# Patient Record
Sex: MALE | Race: WHITE | NOT HISPANIC OR LATINO | Employment: FULL TIME | URBAN - METROPOLITAN AREA
[De-identification: names, ages, dates, MRNs, and addresses within clinical notes are randomized per-mention and may not be internally consistent; named-entity substitution may affect disease eponyms.]

---

## 2022-08-01 ENCOUNTER — LAB (OUTPATIENT)
Dept: LAB | Facility: CLINIC | Age: 37
End: 2022-08-01
Payer: COMMERCIAL

## 2022-08-01 DIAGNOSIS — Z20.822 ENCOUNTER FOR LABORATORY TESTING FOR COVID-19 VIRUS: ICD-10-CM

## 2022-08-01 LAB — SARS-COV-2 RNA RESP QL NAA+PROBE: NEGATIVE

## 2022-08-01 PROCEDURE — U0003 INFECTIOUS AGENT DETECTION BY NUCLEIC ACID (DNA OR RNA); SEVERE ACUTE RESPIRATORY SYNDROME CORONAVIRUS 2 (SARS-COV-2) (CORONAVIRUS DISEASE [COVID-19]), AMPLIFIED PROBE TECHNIQUE, MAKING USE OF HIGH THROUGHPUT TECHNOLOGIES AS DESCRIBED BY CMS-2020-01-R: HCPCS

## 2022-08-01 PROCEDURE — U0005 INFEC AGEN DETEC AMPLI PROBE: HCPCS

## 2023-12-20 ENCOUNTER — HOSPITAL ENCOUNTER (EMERGENCY)
Facility: HOSPITAL | Age: 38
Discharge: HOME | End: 2023-12-20
Attending: STUDENT IN AN ORGANIZED HEALTH CARE EDUCATION/TRAINING PROGRAM | Admitting: STUDENT IN AN ORGANIZED HEALTH CARE EDUCATION/TRAINING PROGRAM
Payer: COMMERCIAL

## 2023-12-20 ENCOUNTER — APPOINTMENT (OUTPATIENT)
Dept: RADIOLOGY | Facility: HOSPITAL | Age: 38
End: 2023-12-20
Attending: EMERGENCY MEDICINE
Payer: COMMERCIAL

## 2023-12-20 VITALS
OXYGEN SATURATION: 98 % | HEIGHT: 77 IN | DIASTOLIC BLOOD PRESSURE: 97 MMHG | SYSTOLIC BLOOD PRESSURE: 166 MMHG | WEIGHT: 315 LBS | RESPIRATION RATE: 16 BRPM | BODY MASS INDEX: 37.19 KG/M2 | TEMPERATURE: 97.1 F | HEART RATE: 95 BPM

## 2023-12-20 DIAGNOSIS — R05.4 COUGH SYNCOPE: ICD-10-CM

## 2023-12-20 DIAGNOSIS — R55 COUGH SYNCOPE: ICD-10-CM

## 2023-12-20 LAB
ALBUMIN SERPL BCG-MCNC: 4.4 G/DL (ref 3.5–5.2)
ALP SERPL-CCNC: 74 U/L (ref 40–150)
ALT SERPL W P-5'-P-CCNC: 46 U/L (ref 0–70)
ANION GAP SERPL CALCULATED.3IONS-SCNC: 11 MMOL/L (ref 7–15)
AST SERPL W P-5'-P-CCNC: 26 U/L (ref 0–45)
BASOPHILS # BLD AUTO: 0 10E3/UL (ref 0–0.2)
BASOPHILS NFR BLD AUTO: 1 %
BILIRUB DIRECT SERPL-MCNC: <0.2 MG/DL (ref 0–0.3)
BILIRUB SERPL-MCNC: 0.5 MG/DL
BUN SERPL-MCNC: 11.4 MG/DL (ref 6–20)
CALCIUM SERPL-MCNC: 9 MG/DL (ref 8.6–10)
CHLORIDE SERPL-SCNC: 106 MMOL/L (ref 98–107)
CREAT SERPL-MCNC: 0.78 MG/DL (ref 0.67–1.17)
DEPRECATED HCO3 PLAS-SCNC: 23 MMOL/L (ref 22–29)
EGFRCR SERPLBLD CKD-EPI 2021: >90 ML/MIN/1.73M2
EOSINOPHIL # BLD AUTO: 0.2 10E3/UL (ref 0–0.7)
EOSINOPHIL NFR BLD AUTO: 2 %
ERYTHROCYTE [DISTWIDTH] IN BLOOD BY AUTOMATED COUNT: 12.5 % (ref 10–15)
GLUCOSE SERPL-MCNC: 93 MG/DL (ref 70–99)
HCT VFR BLD AUTO: 42.3 % (ref 40–53)
HGB BLD-MCNC: 14.1 G/DL (ref 13.3–17.7)
IMM GRANULOCYTES # BLD: 0 10E3/UL
IMM GRANULOCYTES NFR BLD: 0 %
LYMPHOCYTES # BLD AUTO: 1.9 10E3/UL (ref 0.8–5.3)
LYMPHOCYTES NFR BLD AUTO: 23 %
MCH RBC QN AUTO: 29.5 PG (ref 26.5–33)
MCHC RBC AUTO-ENTMCNC: 33.3 G/DL (ref 31.5–36.5)
MCV RBC AUTO: 89 FL (ref 78–100)
MONOCYTES # BLD AUTO: 0.9 10E3/UL (ref 0–1.3)
MONOCYTES NFR BLD AUTO: 11 %
NEUTROPHILS # BLD AUTO: 5.1 10E3/UL (ref 1.6–8.3)
NEUTROPHILS NFR BLD AUTO: 63 %
NRBC # BLD AUTO: 0 10E3/UL
NRBC BLD AUTO-RTO: 0 /100
PLATELET # BLD AUTO: 222 10E3/UL (ref 150–450)
POTASSIUM SERPL-SCNC: 4.1 MMOL/L (ref 3.4–5.3)
PROT SERPL-MCNC: 7.2 G/DL (ref 6.4–8.3)
RBC # BLD AUTO: 4.78 10E6/UL (ref 4.4–5.9)
SODIUM SERPL-SCNC: 140 MMOL/L (ref 135–145)
TROPONIN T SERPL HS-MCNC: <6 NG/L
WBC # BLD AUTO: 8.1 10E3/UL (ref 4–11)

## 2023-12-20 PROCEDURE — 71046 X-RAY EXAM CHEST 2 VIEWS: CPT

## 2023-12-20 PROCEDURE — 36415 COLL VENOUS BLD VENIPUNCTURE: CPT

## 2023-12-20 PROCEDURE — 84484 ASSAY OF TROPONIN QUANT: CPT

## 2023-12-20 PROCEDURE — 85025 COMPLETE CBC W/AUTO DIFF WBC: CPT

## 2023-12-20 PROCEDURE — 80053 COMPREHEN METABOLIC PANEL: CPT

## 2023-12-20 PROCEDURE — 82248 BILIRUBIN DIRECT: CPT

## 2023-12-20 PROCEDURE — 99285 EMERGENCY DEPT VISIT HI MDM: CPT | Mod: 25

## 2023-12-20 PROCEDURE — 93005 ELECTROCARDIOGRAM TRACING: CPT | Performed by: EMERGENCY MEDICINE

## 2023-12-20 RX ORDER — PREDNISONE 20 MG/1
TABLET ORAL
Qty: 10 TABLET | Refills: 0 | Status: SHIPPED | OUTPATIENT
Start: 2023-12-20 | End: 2024-04-15

## 2023-12-20 ASSESSMENT — ACTIVITIES OF DAILY LIVING (ADL): ADLS_ACUITY_SCORE: 35

## 2023-12-20 NOTE — ED PROVIDER NOTES
"Emergency Department Encounter   NAME: Sunil Butts ; AGE: 38 year old male ; YOB: 1985 ; MRN: [id number] ; PCP: No Ref-Primary, Physician   ED PROVIDER: Aimee Graham PA-C    Evaluation Date & Time:   No admission date for patient encounter.    CHIEF COMPLAINT:  Cough      Impression and Plan   Medical Decision Making    Sunil Butts is a 38 year old male with a history of mild persistent asthma who presents for evaluation of a syncopal episode after a coughing fit that lasted for \"some time\" and felt like he could not catch his breath and then passed out for 10-15 seconds. When he came to, he felt a little bit lightheaded but denies hitting his head.  His wife denies that he was jerking/moving at all or rapid eye movements. He denies biting his tongue or loss of bladder control.  Denies hx VTE, seizures, heart disease, or other syncopal episodes. Denies recent travel, surgery, hospitalization. No hx VTE, heart disease, or hx syncopal episodes. Vitals afebrile, not tachycardic, and not hypoxic. On exam well-appearing in no acute distress sitting upright in the exam bed.  Lungs are clear to auscultation without rales, wheezing, stridor throughout.  Heart with regular rate and rhythm without adventitious sounds auscultated.    Differential diagnosis includes vasovagal or situational such as coughing or valsalva, cardiac causes including pauses, arrhythmias, or murmurs, pulmonary embolism, seizure, electrolyte abnormalities, anemia, pneumonia. Emergency department work up included chest x-ray, EKG, and basic labs eluding troponin, CMP, and CBC. Work up revealed no leukocytosis.  No electrolyte abnormalities.  Normal troponin less than 6.  Normal hemoglobin..  Given that all of these labs are normal, this is reassuring.  This is unlikely secondary to lab finding such as electrolyte abnormalities or anemia.  EKG showed normal sinus rhythm and with a troponin less than 6 and no chest pain, " cardiac causes such as arrhythmias or pauses are unlikely.  Chest x-ray showed no evidence of pneumonia or infection.  I was able to PERC patient out so did not order a D-dimer.  He is not tachycardic, no recent travel surgery or hospitalizations.  No history of blood clots.  PE is very low on my differential.      Symptoms and work up most consistent with situational/vasovagal type syncope.  Discussed with the patient these findings.  He felt comfortable with this plan.  Given history of asthma and cough that is persisting, recommended that he continue to use his inhaler.  Also prescribed a 5-day course of prednisone that he can  from the pharmacy.  Discussed he may want to get a primary care provider to discuss further treatment of his asthma.  Although was well-controlled up until this point, he may need to be on additional treatments or have additional workup.  Gave him the information for [facility] because he did not have a place he was going currently. Patient was discharged in stable condition but instructed to return to the emergency department with any new or worsening of symptoms. Patient expressed understanding, feels comfortable, and is in agreement with this plan. All questions addressed prior to discharge.    History:  Supplemental history from: Documented in chart, if applicable  External Record(s) reviewed: Documented in chart, if applicable. and Outpatient Record: Reviewed from 2/12/2020.  Patient was seen in the clinic for positive influenza and cough.  Certainly better and now had a low-grade fever with a cough that returned, rattling in his chest when he sleeps, myalgias, and bodyaches.  Was diagnosed with pneumonia    Work Up:  Chart documentation includes differential considered and any EKGs or imaging independently interpreted by provider, where specified.  In additional to work up documented, I considered the following work up: na    External consultation:  Discussion of  "management with another provider: na    Complicating factors:  Care impacted by chronic illness: asthma  Care affected by social determinants of health: N/A    Disposition considerations: Discharge. I prescribed additional prescription strength medication(s) as charted. See documentation for any additional details.      ED COURSE:  1800 I met and introduced myself to the patient. I gathered initial history and performed my physical exam. We discussed plan for initial workup.   1815 I have staffed the patient with Dr. Pee Meyers, ED MD, who has evaluated the patient and agrees with all aspects of today's care.   1900 We discussed the plan for discharge and the patient is agreeable. Reviewed supportive cares, symptomatic treatment, outpatient follow up, and reasons to return to the Emergency Department. Patient to be discharged by ED RN.           FINAL IMPRESSION:    ICD-10-CM    1. Cough syncope  R55     R05.4           MEDICATIONS GIVEN IN THE EMERGENCY DEPARTMENT:  Medications - No data to display    NEW PRESCRIPTIONS STARTED AT TODAY'S ED VISIT:  Discharge Medication List as of 12/20/2023  8:07 PM        START taking these medications    Details   predniSONE (DELTASONE) 20 MG tablet Take two tablets (= 40mg) each day for 5 (five) days, Disp-10 tablet, R-0, E-Prescribe             HPI   Patient information was obtained from: patient   Use of Intrepreter: N/A     Sunil Butts is a 38 year old male with a history of mild persistent asthma who presents for evaluation of a syncopal episode. Hx cough x 1 month after diagnosis of covid. Today he was having a coughing fit that lasted for \"some time\" and he felt like he could not catch his breath and he passed out.  He was sitting on the couch next to his wife when this happened.  He was unresponsive for about 10 seconds.  When he came to, he felt a little bit lightheaded but denies hitting his head.  His wife denies that he was jerking/moving at all or " "rapid eye movements. He denies biting his tongue or loss of bladder control.  Denies hx VTE, seizures, heart disease, or other syncopal episodes. Denies recent travel, surgery, hospitalization.     He was seen in the urgent care on 12/2/2023 for a cough.  At that point had had COVID for 2 weeks and his cough is not getting better.  They did a chest x-ray to rule out pneumonia.  They treated him with steroids and gave him an inhaler.  Felt like his cough got a little bit better after being on the steroids but still lingers.  He is taking Robitussin and Advil for his symptoms..  His asthma is well-controlled typically, only using a rescue inhaler on occasion.      Denies fevers, chills, shortness of breath, palpitations, chest pain, nausea, vomiting, diarrhea, abdominal pain, melena, hematochezia, hemoptysis, changes in vision.     Medical History     No past medical history on file.    No past surgical history on file.    No family history on file.         predniSONE (DELTASONE) 20 MG tablet        Physical Exam     First Vitals:  Patient Vitals for the past 24 hrs:   BP Temp Temp src Pulse Resp SpO2 Height Weight   12/20/23 2010 (!) 166/97 -- -- 95 -- 98 % -- --   12/20/23 1930 (!) 144/89 -- -- 85 -- 98 % -- --   12/20/23 1900 (!) 143/90 -- -- 89 -- 97 % -- --   12/20/23 1858 136/89 -- -- 83 -- 98 % -- --   12/20/23 1634 (!) 163/88 97.1  F (36.2  C) Temporal 92 16 98 % 1.956 m (6' 5\") 149.7 kg (330 lb)       PHYSICAL EXAM:   Physical Exam  Constitutional:       General: He is not in acute distress.     Appearance: Normal appearance. He is not ill-appearing.   HENT:      Mouth/Throat:      Mouth: Mucous membranes are moist.      Pharynx: No posterior oropharyngeal erythema.   Cardiovascular:      Rate and Rhythm: Normal rate and regular rhythm.      Pulses: Normal pulses.      Heart sounds: Normal heart sounds.   Pulmonary:      Effort: Pulmonary effort is normal. No respiratory distress.      Breath sounds: Normal " breath sounds. No stridor. No wheezing, rhonchi or rales.   Chest:      Chest wall: No tenderness.   Abdominal:      General: Abdomen is flat.      Tenderness: There is no abdominal tenderness.   Musculoskeletal:         General: No tenderness. Normal range of motion.      Right lower leg: No edema.      Left lower leg: No edema.   Skin:     General: Skin is warm.   Neurological:      General: No focal deficit present.      Mental Status: He is alert.      Sensory: No sensory deficit.           Results     LAB:  All pertinent labs reviewed and interpreted  Labs Ordered and Resulted from Time of ED Arrival to Time of ED Departure   BASIC METABOLIC PANEL - Normal       Result Value    Sodium 140      Potassium 4.1      Chloride 106      Carbon Dioxide (CO2) 23      Anion Gap 11      Urea Nitrogen 11.4      Creatinine 0.78      GFR Estimate >90      Calcium 9.0      Glucose 93     HEPATIC FUNCTION PANEL - Normal    Protein Total 7.2      Albumin 4.4      Bilirubin Total 0.5      Alkaline Phosphatase 74      AST 26      ALT 46      Bilirubin Direct <0.20     TROPONIN T, HIGH SENSITIVITY - Normal    Troponin T, High Sensitivity <6     CBC WITH PLATELETS AND DIFFERENTIAL    WBC Count 8.1      RBC Count 4.78      Hemoglobin 14.1      Hematocrit 42.3      MCV 89      MCH 29.5      MCHC 33.3      RDW 12.5      Platelet Count 222      % Neutrophils 63      % Lymphocytes 23      % Monocytes 11      % Eosinophils 2      % Basophils 1      % Immature Granulocytes 0      NRBCs per 100 WBC 0      Absolute Neutrophils 5.1      Absolute Lymphocytes 1.9      Absolute Monocytes 0.9      Absolute Eosinophils 0.2      Absolute Basophils 0.0      Absolute Immature Granulocytes 0.0      Absolute NRBCs 0.0         RADIOLOGY:  XR Chest 2 Views   Final Result   IMPRESSION: Negative chest.          ECG:    Performed at: 12/20/23  Impression: normal sinus rhythm    Rate: 81  Rhythm: normal sinus rhythm  Axis: normal  MA Interval: 162  QRS  Interval: 90  QTc Interval: 425  ST Changes: none  Comparison: none    EKG results reviewed and interpreted by Dr. Gee Miller, ED MD.     Aimee Graham PA-C  Emergency Medicine   [facility] EMERGENCY DEPARTMENT       Aimee Graham PA-C  12/20/23 8794

## 2023-12-20 NOTE — ED TRIAGE NOTES
Pt with covid a month ago and has had persistent cough since.  Pt also diagnoses with bronchitis and given steroids but cough continued.  Today pt had a coughing spell and passed out for 10 secs.  Denies fevers/chills.

## 2023-12-21 NOTE — ED NOTES
Discussed following up with PCP regarding cough and blood pressure once he is done taking cough medications and prednisone.  Patient verbalized understanding.

## 2023-12-21 NOTE — ED PROVIDER NOTES
"Emergency Department Midlevel Supervisory Note     I personally saw the patient and performed a substantive portion of the visit including all aspects of the medical decision making.    ED Course:  7:07 PM Aimee Graham PA-C staffed patient with me. I agree with their assessment and plan of management, and I will see the patient.   I met with the patient to introduce myself, gather additional history, perform my initial exam, and discuss the plan.     Brief HPI:     Sunil Butts is a 38 year old male who presents for evaluation of cough x 1 month. He coughed today for about 1 minute, and was unable to pass out; in which he eventually passed out. Was unresponsive for about 10 seconds. Felt mild lightheadedness and disorientation, but denies hitting his head. Had Covid since 12/2/23, and cough hasn't got better since then.     I, Koki Rhodes , am serving as a scribe to document services personally performed by Pee Quan MD based on my observations and the provider's statements to me.   I, Pee Quan MD attest that Koki Rhodes  was acting in a scribe capacity, has observed my performance of the services and has documented them in accordance with my direction.    Brief Physical Exam: BP (!) 166/97   Pulse 95   Temp 97.1  F (36.2  C) (Temporal)   Resp 16   Ht 1.956 m (6' 5\")   Wt 149.7 kg (330 lb)   SpO2 98%   BMI 39.13 kg/m    Constitutional:  Alert, in no acute distress  EYES: Conjunctivae clear  HENT:  Atraumatic, normocephalic  Respiratory:  Respirations even, unlabored, in no acute respiratory distress  Cardiovascular:  Regular rate and rhythm, good peripheral perfusion  GI: Soft, nondistended, nontender, no palpable masses, no rebound, no guarding   Musculoskeletal:  No edema. No cyanosis. Range of motion major extremities intact.    Integument: Warm, Dry, No erythema, No rash.   Neurologic:  Alert & oriented, no focal deficits noted  Psych: Normal mood and affect   "   MDM:  Patient's history is consistent with cough syncope, and fortunately the workup to look for other etiologies of syncope and a 38-year-old healthy male were negative.  Including negative troponin, reassuring EKG, no electrolyte abnormalities or kidney injury, no anemia.  Patient discharged with plans for outpatient follow-up.           1. Cough syncope        Labs and Imaging:  Results for orders placed or performed during the hospital encounter of 12/20/23   XR Chest 2 Views    Impression    IMPRESSION: Negative chest.   Basic metabolic panel   Result Value Ref Range    Sodium 140 135 - 145 mmol/L    Potassium 4.1 3.4 - 5.3 mmol/L    Chloride 106 98 - 107 mmol/L    Carbon Dioxide (CO2) 23 22 - 29 mmol/L    Anion Gap 11 7 - 15 mmol/L    Urea Nitrogen 11.4 6.0 - 20.0 mg/dL    Creatinine 0.78 0.67 - 1.17 mg/dL    GFR Estimate >90 >60 mL/min/1.73m2    Calcium 9.0 8.6 - 10.0 mg/dL    Glucose 93 70 - 99 mg/dL   Hepatic function panel   Result Value Ref Range    Protein Total 7.2 6.4 - 8.3 g/dL    Albumin 4.4 3.5 - 5.2 g/dL    Bilirubin Total 0.5 <=1.2 mg/dL    Alkaline Phosphatase 74 40 - 150 U/L    AST 26 0 - 45 U/L    ALT 46 0 - 70 U/L    Bilirubin Direct <0.20 0.00 - 0.30 mg/dL   Result Value Ref Range    Troponin T, High Sensitivity <6 <=22 ng/L   CBC with platelets and differential   Result Value Ref Range    WBC Count 8.1 4.0 - 11.0 10e3/uL    RBC Count 4.78 4.40 - 5.90 10e6/uL    Hemoglobin 14.1 13.3 - 17.7 g/dL    Hematocrit 42.3 40.0 - 53.0 %    MCV 89 78 - 100 fL    MCH 29.5 26.5 - 33.0 pg    MCHC 33.3 31.5 - 36.5 g/dL    RDW 12.5 10.0 - 15.0 %    Platelet Count 222 150 - 450 10e3/uL    % Neutrophils 63 %    % Lymphocytes 23 %    % Monocytes 11 %    % Eosinophils 2 %    % Basophils 1 %    % Immature Granulocytes 0 %    NRBCs per 100 WBC 0 <1 /100    Absolute Neutrophils 5.1 1.6 - 8.3 10e3/uL    Absolute Lymphocytes 1.9 0.8 - 5.3 10e3/uL    Absolute Monocytes 0.9 0.0 - 1.3 10e3/uL    Absolute Eosinophils  0.2 0.0 - 0.7 10e3/uL    Absolute Basophils 0.0 0.0 - 0.2 10e3/uL    Absolute Immature Granulocytes 0.0 <=0.4 10e3/uL    Absolute NRBCs 0.0 10e3/uL     I have reviewed the relevant laboratory and radiology studies    Procedures:  I was present for the key portions of this procedure: none    Pee Quan MD  [facility] EMERGENCY DEPARTMENT  [address] [place] [id number] [phone]     Pee Quan MD  12/21/23 0052

## 2023-12-21 NOTE — DISCHARGE INSTRUCTIONS
You were seen in the ER after a syncopal episode secondary to a coughing fit.  Syncope can be caused by many things including benign vagal maneuvers such as coughing but can sometimes be caused by other more serious conditions.  A workup was done to assess that nothing more serious was going on.  We did an EKG of your heart to assess for arrhythmias which showed normal rhythm.  We did a chest x-ray to assess for a pneumonia or fluid around your lungs which was normal.  We did some blood work to look at your heart enzymes, electrolytes, kidney function, liver function, and for signs of infection which were all normal.  Your syncopal episode was likely secondary to a coughing fit.  Your body response of this lack of air from prolonged coughing by fainting.  Your cough should resolve on its own with time.  I would continue to use over-the-counter cough medicine.  Continue to use your inhaler when you feel short of breath or hear wheezing. I will also give you another 5 day course of prednisone to help with your symptoms. You may want to follow-up with your primary care provider if this cough persists for reevaluation.  Please return to the emergency room if you are having any difficulty breathing, chest pain, or any other concerning symptoms.

## 2023-12-29 ENCOUNTER — OFFICE VISIT (OUTPATIENT)
Dept: FAMILY MEDICINE | Facility: CLINIC | Age: 38
End: 2023-12-29
Payer: COMMERCIAL

## 2023-12-29 VITALS
BODY MASS INDEX: 38.94 KG/M2 | RESPIRATION RATE: 16 BRPM | OXYGEN SATURATION: 95 % | TEMPERATURE: 98.4 F | HEART RATE: 101 BPM | WEIGHT: 315 LBS | DIASTOLIC BLOOD PRESSURE: 91 MMHG | SYSTOLIC BLOOD PRESSURE: 144 MMHG

## 2023-12-29 DIAGNOSIS — Z11.59 NEED FOR HEPATITIS C SCREENING TEST: ICD-10-CM

## 2023-12-29 DIAGNOSIS — Z11.4 SCREENING FOR HIV (HUMAN IMMUNODEFICIENCY VIRUS): ICD-10-CM

## 2023-12-29 DIAGNOSIS — Z13.220 LIPID SCREENING: ICD-10-CM

## 2023-12-29 DIAGNOSIS — J45.909 REACTIVE AIRWAY DISEASE WITHOUT COMPLICATION, UNSPECIFIED ASTHMA SEVERITY, UNSPECIFIED WHETHER PERSISTENT: Primary | ICD-10-CM

## 2023-12-29 DIAGNOSIS — R05.2 SUBACUTE COUGH: ICD-10-CM

## 2023-12-29 LAB
CHOLEST SERPL-MCNC: 199 MG/DL
FASTING STATUS PATIENT QL REPORTED: ABNORMAL
HCV AB SERPL QL IA: NONREACTIVE
HDLC SERPL-MCNC: 31 MG/DL
HIV 1+2 AB+HIV1 P24 AG SERPL QL IA: NONREACTIVE
LDLC SERPL CALC-MCNC: 127 MG/DL
NONHDLC SERPL-MCNC: 168 MG/DL
TRIGL SERPL-MCNC: 207 MG/DL

## 2023-12-29 PROCEDURE — 80061 LIPID PANEL: CPT

## 2023-12-29 PROCEDURE — 86803 HEPATITIS C AB TEST: CPT

## 2023-12-29 PROCEDURE — 87389 HIV-1 AG W/HIV-1&-2 AB AG IA: CPT

## 2023-12-29 PROCEDURE — 36415 COLL VENOUS BLD VENIPUNCTURE: CPT

## 2023-12-29 PROCEDURE — 99214 OFFICE O/P EST MOD 30 MIN: CPT | Mod: GC

## 2023-12-29 RX ORDER — ALBUTEROL SULFATE 90 UG/1
2 AEROSOL, METERED RESPIRATORY (INHALATION)
Status: ON HOLD | COMMUNITY
Start: 2023-12-02 | End: 2024-09-10

## 2023-12-29 RX ORDER — ALBUTEROL SULFATE 90 UG/1
2 AEROSOL, METERED RESPIRATORY (INHALATION) EVERY 6 HOURS PRN
Qty: 18 G | Refills: 0 | Status: SHIPPED | OUTPATIENT
Start: 2023-12-29 | End: 2024-01-23

## 2023-12-29 RX ORDER — BENZONATATE 100 MG/1
CAPSULE ORAL
COMMUNITY
Start: 2023-12-19 | End: 2024-04-15

## 2023-12-29 RX ORDER — BENZONATATE 100 MG/1
100 CAPSULE ORAL 3 TIMES DAILY PRN
Qty: 30 CAPSULE | Refills: 0 | Status: SHIPPED | OUTPATIENT
Start: 2023-12-29 | End: 2024-04-15

## 2023-12-29 RX ORDER — FLUTICASONE FUROATE 100 UG/1
1 POWDER RESPIRATORY (INHALATION) DAILY
Qty: 30 EACH | Refills: 0 | Status: SHIPPED | OUTPATIENT
Start: 2023-12-29 | End: 2024-04-15

## 2023-12-29 NOTE — PROGRESS NOTES
Preceptor Attestation:    I discussed the patient with the resident and evaluated the patient in person. I have verified the content of the note, which accurately reflects my assessment of the patient and the plan of care.   Supervising Physician:  Tomas Nuñez MD.

## 2023-12-29 NOTE — PROGRESS NOTES
{PROVIDER CHARTING PREFERENCE:912756}    Janet Barber is a 38 year old, presenting for the following health issues:  Other (New patient/Covid before thanksgiving/Urgent care after that went to hospital 1 week ago that caused him to faint. Following up from hospital after getting steroid medication to help with cough. )  {(!) Visit Details have not yet been documented.  Please enter Visit Details and then use this list to pull in documentation. (Optional):729483}    [facility]     [employer], went to clinic before covid. Test positive for covid prior to thanksgiving. 4-5 weeks barely had a voice. Went to urgent care 2 weeks later, negative for penumonia. Steroid given, helped a bit but didn't shake it. Coughing fit caused fainting, went to ER. Cough a lot better within last 2 days.    Hacking cough unloading car. No issues with SOB, chest tightness except with coughing.     Covid 2021 also. Not as long a cough then.    Asthma as a kid. Has not had problems for last 15 years. Did get albuterol inhaler at urgent care. Does help when he uses the inhaler. Using inhaler 3-4 times a day. On Advair as a kid. Hospitalized at some point, nebulizer at home.     No smoking. High blood pressures lately.     Using Mucinex, tessalon.    {MA/LPN/RN Pre-Provider Visit Orders- hCG/UA/Strep (Optional):277173}  {SUPERLIST (Optional):724062}  {additonal problems for provider to add (Optional):623338}      Review of Systems   {ROS COMP (Optional):955008}      Objective    BP (!) 144/91   Pulse 101   Temp 98.4  F (36.9  C) (Oral)   Resp 16   Wt 149 kg (328 lb 6.4 oz)   SpO2 95%   BMI 38.94 kg/m    Body mass index is 38.94 kg/m .  Physical Exam   {Exam List (Optional):578972}    {Diagnostic Test Results (Optional):854654}    {AMBULATORY ATTESTATION (Optional):421713}

## 2023-12-29 NOTE — PROGRESS NOTES
Assessment & Plan     Reactive airway disease without complication, unspecified asthma severity, unspecified whether persistent  Subacute cough  Positive COVID prior Thanksgiving.  Has been seen at urgent care and the ED.  Status-post steroid bursts x 2.  Has been using albuterol inhaler as needed, often 3-4 times daily.  Continues to have fairly significant cough.  Notes history of asthma in childhood recalls being on a daily medication sometime ago.  Suspect there may be some asthma exacerbation component to his current symptoms.  Therefore we will start daily inhaler fluticasone as well as refill albuterol inhaler as needed.  Plan for close follow-up 10 to 14 days for further evaluation of possible asthma exacerbation and appropriate treatment.  - albuterol (PROAIR HFA/PROVENTIL HFA/VENTOLIN HFA) 108 (90 Base) MCG/ACT inhaler; Inhale 2 puffs into the lungs every 6 hours as needed for shortness of breath, wheezing or cough  - fluticasone (ARNUITY ELLIPTA) 100 MCG/ACT inhaler; Inhale 1 puff into the lungs daily  - benzonatate (TESSALON) 100 MG capsule; Take 1 capsule (100 mg) by mouth 3 times daily as needed for cough    Screening for HIV (human immunodeficiency virus)  - HIV Screening    Need for hepatitis C screening test  - Hepatitis C Screen Reflex to HCV RNA Quant and Genotype    Lipid screening  - Lipid panel reflex to direct LDL Non-fasting    Return in about 2 weeks (around 1/12/2024) for Follow up.    TASIA FERNANDEZ MD  [facility] JOSEPH Barber is a 38 year old, presenting for the following health issues:  Other (New patient/Covid before thanksgiving/Urgent care after that went to hospital 1 week ago that caused him to faint. Following up from hospital after getting steroid medication to help with cough. )      HPI   Patient presenting today for follow-up after ED visit on 12/20/2023.  Presented to the ED for evaluation following syncopal episode after coughing fit  during which she felt he could not catch his breath and then passed out for 10-15 seconds.  His wife reportedly witnessed the fall and denied noticing any jerking/moving of the extremities or rapid eye movements.  ED workup included CXR, EKG, troponin, CMP, CBC all of which showed no significant findings.  They suspected that his symptoms were consistent with situational/vasovagal type syncope.    Reviewed recent events with the patient again today.  He reports that he tested positive for COVID prior to Thanksgiving.  His cough lingered as well as increasing hoarseness of his voice.  As result he went to urgent care about 2 weeks later on 12/2/2023 due to concern for possible pneumonia other worsening illness.  At that time, he was prescribed albuterol inhaler as well as dexamethasone 1 day burst.  His symptoms improved briefly but did not quite shake it.  He continued to have cough.  12/20/2023, he had a coughing fit that was followed by syncopal episode as described above.  As result, he again presented to the ED.  Workup negative as described above.  He was sent to 5-day steroid burst prednisone 40 mg for 5 days.  Again, his symptoms improved somewhat after his steroid burst but he continues to have hacking cough.    Patient recalls having COVID possibly in 2021 also.  Had a cough at that time though not as long-lasting as now.  Endorses some SOB, chest tightness associated with coughing but otherwise no SOB, chest tightness, chest pain outside of coughing episodes.    Patient will be establishing care here as well.  He works for [employer] and previously went to a clinic there prior to the onset of COVID.  As result, he has not seen a physician in quite some time.  Past medical history is remarkable for asthma in childhood.  Does not think he has any problems with it for the last 15 years or so.  He does recall at one point being on a daily inhaler as a kid, possibly Advair.  Also recalls having a  nebulizer at home and possibly being hospitalized at some point, but no times requiring intubation or further escalation of cares.  Since getting the albuterol at urgent care, he has been using it 3-4 times a day.  He does have relief following use of the albuterol inhaler.  The inhaler, he is also using Mucinex and Tessalon for symptom cares.  Denies any history of tobacco use.    Patient notes high blood pressures near 140s/90s at urgent care visit, ED visit, and at his visit today.  No history of diagnosed high blood pressure/hypertension but again he has not been to a physician in quite some time.  Would like to keep monitoring this closely and stay on top of it.  Denies any chest pain, SOB, headache, lower extremity swelling.      Review of Systems   CONSTITUTIONAL: NEGATIVE for fever, chills, change in weight  ENT/MOUTH: NEGATIVE for ear, mouth and throat problems      Objective    BP (!) 144/91   Pulse 101   Temp 98.4  F (36.9  C) (Oral)   Resp 16   Wt 149 kg (328 lb 6.4 oz)   SpO2 95%   BMI 38.94 kg/m    Body mass index is 38.94 kg/m .  Physical Exam   GENERAL: healthy, alert and no distress  RESP: lungs with mild wheezing but otherwise clear to auscultation, frequent loud wet-sounding cough, nonlabored breathing on room air, able to speak in complete sentences  CV: regular rate and rhythm, normal S1 S2, no S3 or S4, no murmur, click or rub, no peripheral edema and peripheral pulses strong  MS: no gross musculoskeletal defects noted, no edema  NEURO: Normal strength and tone, mentation intact and speech normal  PSYCH: mentation appears normal, affect normal/bright    Admission on 12/20/2023, Discharged on 12/20/2023   Component Date Value Ref Range Status    Sodium 12/20/2023 140  135 - 145 mmol/L Final    Reference intervals for this test were updated on 09/26/2023 to more accurately reflect our healthy population. There may be differences in the flagging of prior results with similar values performed  with this method. Interpretation of those prior results can be made in the context of the updated reference intervals.     Potassium 12/20/2023 4.1  3.4 - 5.3 mmol/L Final    Chloride 12/20/2023 106  98 - 107 mmol/L Final    Carbon Dioxide (CO2) 12/20/2023 23  22 - 29 mmol/L Final    Anion Gap 12/20/2023 11  7 - 15 mmol/L Final    Urea Nitrogen 12/20/2023 11.4  6.0 - 20.0 mg/dL Final    Creatinine 12/20/2023 0.78  0.67 - 1.17 mg/dL Final    GFR Estimate 12/20/2023 >90  >60 mL/min/1.73m2 Final    Calcium 12/20/2023 9.0  8.6 - 10.0 mg/dL Final    Glucose 12/20/2023 93  70 - 99 mg/dL Final    Protein Total 12/20/2023 7.2  6.4 - 8.3 g/dL Final    Albumin 12/20/2023 4.4  3.5 - 5.2 g/dL Final    Bilirubin Total 12/20/2023 0.5  <=1.2 mg/dL Final    Alkaline Phosphatase 12/20/2023 74  40 - 150 U/L Final    Reference intervals for this test were updated on 11/14/2023 to more accurately reflect our healthy population. There may be differences in the flagging of prior results with similar values performed with this method. Interpretation of those prior results can be made in the context of the updated reference intervals.    AST 12/20/2023 26  0 - 45 U/L Final    Reference intervals for this test were updated on 6/12/2023 to more accurately reflect our healthy population. There may be differences in the flagging of prior results with similar values performed with this method. Interpretation of those prior results can be made in the context of the updated reference intervals.    ALT 12/20/2023 46  0 - 70 U/L Final    Reference intervals for this test were updated on 6/12/2023 to more accurately reflect our healthy population. There may be differences in the flagging of prior results with similar values performed with this method. Interpretation of those prior results can be made in the context of the updated reference intervals.      Bilirubin Direct 12/20/2023 <0.20  0.00 - 0.30 mg/dL Final    Troponin T, High Sensitivity  12/20/2023 <6  <=22 ng/L Final    Either a High Sensitivity Troponin T baseline (0 hours) value = 100 ng/L, or an increase in High Sensitivity Troponin T = 7 ng/L at 2 hours compared to 0 hours (2-0 hours), suggests myocardial injury, and urgent clinical attention is required.    If the 2-0 hours increase is <7 ng/L, a High Sensitivity Troponin T result above gender-specific reference ranges warrants further evaluation.   Recommendations for further evaluation include correlation with clinical decision-making tool (e.g., HEART), a 3rd High Sensitivity Troponin T test 2 hours after the 2nd (a 20% change from baseline would represent concern), admission for observation, close PCC/cardiology follow-up, or urgent outpatient provocative testing.    WBC Count 12/20/2023 8.1  4.0 - 11.0 10e3/uL Final    RBC Count 12/20/2023 4.78  4.40 - 5.90 10e6/uL Final    Hemoglobin 12/20/2023 14.1  13.3 - 17.7 g/dL Final    Hematocrit 12/20/2023 42.3  40.0 - 53.0 % Final    MCV 12/20/2023 89  78 - 100 fL Final    MCH 12/20/2023 29.5  26.5 - 33.0 pg Final    MCHC 12/20/2023 33.3  31.5 - 36.5 g/dL Final    RDW 12/20/2023 12.5  10.0 - 15.0 % Final    Platelet Count 12/20/2023 222  150 - 450 10e3/uL Final    % Neutrophils 12/20/2023 63  % Final    % Lymphocytes 12/20/2023 23  % Final    % Monocytes 12/20/2023 11  % Final    % Eosinophils 12/20/2023 2  % Final    % Basophils 12/20/2023 1  % Final    % Immature Granulocytes 12/20/2023 0  % Final    NRBCs per 100 WBC 12/20/2023 0  <1 /100 Final    Absolute Neutrophils 12/20/2023 5.1  1.6 - 8.3 10e3/uL Final    Absolute Lymphocytes 12/20/2023 1.9  0.8 - 5.3 10e3/uL Final    Absolute Monocytes 12/20/2023 0.9  0.0 - 1.3 10e3/uL Final    Absolute Eosinophils 12/20/2023 0.2  0.0 - 0.7 10e3/uL Final    Absolute Basophils 12/20/2023 0.0  0.0 - 0.2 10e3/uL Final    Absolute Immature Granulocytes 12/20/2023 0.0  <=0.4 10e3/uL Final    Absolute NRBCs 12/20/2023 0.0  10e3/uL Final       ----- Service  Performed and Documented by Resident or Fellow ------

## 2024-01-12 ENCOUNTER — TELEPHONE (OUTPATIENT)
Dept: FAMILY MEDICINE | Facility: CLINIC | Age: 39
End: 2024-01-12

## 2024-01-12 ENCOUNTER — OFFICE VISIT (OUTPATIENT)
Dept: FAMILY MEDICINE | Facility: CLINIC | Age: 39
End: 2024-01-12
Payer: COMMERCIAL

## 2024-01-12 VITALS
SYSTOLIC BLOOD PRESSURE: 143 MMHG | DIASTOLIC BLOOD PRESSURE: 91 MMHG | WEIGHT: 315 LBS | HEART RATE: 86 BPM | OXYGEN SATURATION: 97 % | RESPIRATION RATE: 16 BRPM | HEIGHT: 77 IN | TEMPERATURE: 98.2 F | BODY MASS INDEX: 37.19 KG/M2

## 2024-01-12 DIAGNOSIS — R05.2 SUBACUTE COUGH: Primary | ICD-10-CM

## 2024-01-12 DIAGNOSIS — J45.909 REACTIVE AIRWAY DISEASE WITHOUT COMPLICATION, UNSPECIFIED ASTHMA SEVERITY, UNSPECIFIED WHETHER PERSISTENT: ICD-10-CM

## 2024-01-12 DIAGNOSIS — R03.0 ELEVATED BLOOD PRESSURE READING WITHOUT DIAGNOSIS OF HYPERTENSION: Primary | ICD-10-CM

## 2024-01-12 PROCEDURE — 90686 IIV4 VACC NO PRSV 0.5 ML IM: CPT

## 2024-01-12 PROCEDURE — 90471 IMMUNIZATION ADMIN: CPT

## 2024-01-12 PROCEDURE — 99214 OFFICE O/P EST MOD 30 MIN: CPT | Mod: 25

## 2024-01-12 RX ORDER — FLUTICASONE PROPIONATE 110 UG/1
1 AEROSOL, METERED RESPIRATORY (INHALATION) 2 TIMES DAILY
Qty: 12 G | Refills: 3 | Status: SHIPPED | OUTPATIENT
Start: 2024-01-12 | End: 2024-04-15

## 2024-01-12 NOTE — PROGRESS NOTES
"  Assessment & Plan     Reactive airway disease without complication, unspecified asthma severity, unspecified whether persistent  Patient tested positive for COVID prior to Thanksgiving 2023.  Had a lingering and fairly significant cough causing SOB following COVID infection.  In addition, has history of childhood asthma for which he was on a daily medication for some period of time.  Given his history and current symptoms, elected to again start a daily maintenance medication fluticasone.  This was started at his follow-up visit on 12/29/2023.  It has worked well for him.  His cough has essentially resolved.  We discussed continuing on this maintenance medication at least through the winter season.  Patient is in agreement with this plan but is wondering about switching to a different medication as the previously prescribed inhaler fell under his \"premium\" insurance medication list and required a co-pay.  Therefore, prescribed fluticasone/Flovent.  Patient called back, unfortunately this was not covered either.  Instead, we will prescribe mometasone as below.   - mometasone furoate (ASMANEX HFA) 100 MCG/ACT inhaler     Elevated blood pressure reading without diagnosis of hypertension  No history of hypertension or elevated blood pressures however prior to experiencing the above issue, patient had not seen a doctor for approximately 4 years.  Since being seen for the above issue, several elevated blood pressures have been noted.  At 12/29/2023 clinic visit, blood pressure was 144/91.  At clinic visit today, blood pressure 143/91.  Blood pressure similarly elevated at urgent care/ED previously.  Discussed with the patient consideration for starting a medication for hypertension.  After further discussion with patient and attending physician Dr. Benavides, will obtain ambulatory blood pressure monitoring first prior to making decision to start medication.  Advised patient to check his blood pressure daily for at least 1 " "full week.  Follow-up as scheduled for next month to review these numbers and for further consideration as to if medication management is warranted.  Patient is in agreement with this plan.  He will also work on lifestyle changes in the interim.  - Home Blood Pressure Monitor Order for DME - ONLY FOR DME    Return in about 1 month (around 2/12/2024) for Follow up.    TASIA FERNANDEZ MD  [facility] JOSEPH Barber is a 38 year old, presenting for the following health issues:  Cough (Follow up cough- using inhaler and states that his cough is better)        1/12/2024    10:38 AM   Additional Questions   Roomed by Mao   Accompanied by self     HPI   Patient presents today for follow-up of cough, reactive airway disease likely secondary to past COVID infection in the setting of distant history of childhood asthma.  At his last visit, started him on a daily inhaler, fluticasone.  He has been taking his medication daily as prescribed.  However it is considered part of his \"premium\" medications for insurance coverage, so it does require a bit of a co-pay.  Therefore he is wondering about switching to different variation of the medication.  Otherwise no concerns.  Cough is much improved, still has mild cough but much less productive and does not cause significant shortness of breath.    Review of Systems   CONSTITUTIONAL: NEGATIVE for fever, chills, change in weight  ENT/MOUTH: NEGATIVE for ear, mouth and throat problems  RESP: NEGATIVE for significant cough or SOB  CV: NEGATIVE for chest pain, palpitations or peripheral edema      Objective    BP (!) 143/91   Pulse 86   Temp 98.2  F (36.8  C) (Oral)   Resp 16   Ht 1.956 m (6' 5\")   Wt (!) 150.1 kg (331 lb)   SpO2 97%   BMI 39.25 kg/m    Body mass index is 39.25 kg/m .  Physical Exam   GENERAL: healthy, alert and no distress  RESP: lungs clear to auscultation - no rales, rhonchi or wheezes  CV: regular rate and rhythm, normal S1 S2, " no S3 or S4, no murmur, click or rub  MS: no gross musculoskeletal defects noted, no edema  SKIN: no suspicious lesions or rashes on exposed skin  NEURO: normal strength and tone, mentation intact and speech normal  PSYCH: mentation appears normal, affect normal/bright    Office Visit on 12/29/2023   Component Date Value Ref Range Status    Cholesterol 12/29/2023 199  <200 mg/dL Final    Triglycerides 12/29/2023 207 (H)  <150 mg/dL Final    Direct Measure HDL 12/29/2023 31 (L)  >=40 mg/dL Final    LDL Cholesterol Calculated 12/29/2023 127 (H)  <=100 mg/dL Final    Non HDL Cholesterol 12/29/2023 168 (H)  <130 mg/dL Final    Patient Fasting > 8hrs? 12/29/2023 Unknown   Final    Hepatitis C Antibody 12/29/2023 Nonreactive  Nonreactive Final    A nonreactive screening test result does not exclude the possibility of exposure to or infection with HCV. Nonreactive screening test results in individuals with prior exposure to HCV may be due to antibody levels below the limit of detection of this assay or lack of reactivity to the HCV antigens used in this assay. Patients with recent HCV infections (<3 months from time of exposure) may have false-negative HCV antibody results due to the time needed for seroconversion (average of 8 to 9 weeks).    HIV Antigen Antibody Combo 12/29/2023 Nonreactive  Nonreactive Final    Negative HIV-1/-2 antigen and antibody screening test results usually indicate the absence of HIV-1 and HIV-2 infection. However, such negative results do not rule-out acute HIV infection.  If acute HIV-1 or HIV-2 infection is suspected, detection of HIV-1 or HIV-2 RNA  is recommended.        ----- Service Performed and Documented by Resident or Fellow ------

## 2024-01-12 NOTE — PROGRESS NOTES
Preceptor Attestation:    I discussed the patient with the resident and evaluated the patient in person. I have verified the content of the note, which accurately reflects my assessment of the patient and the plan of care.   Supervising Physician:  Libra Benavides MD

## 2024-01-12 NOTE — PATIENT INSTRUCTIONS
Tracking Your Blood Pressure    My goal blood pressure is: ______/_______      Date / Time Blood Pressure  Heart Rate

## 2024-01-23 DIAGNOSIS — R05.2 SUBACUTE COUGH: ICD-10-CM

## 2024-01-23 RX ORDER — ALBUTEROL SULFATE 90 UG/1
2 AEROSOL, METERED RESPIRATORY (INHALATION) EVERY 6 HOURS PRN
Qty: 18 G | Refills: 0 | Status: SHIPPED | OUTPATIENT
Start: 2024-01-23

## 2024-01-23 NOTE — TELEPHONE ENCOUNTER
Medication requested: ALBUTEROL HFA (PROAIR) INHALER   Last office visit: 1/12/24  Future [facility] appointments: 2/9/24  Medication last refilled: 12/29/23  Last qualifying labs: none    Prescription approved per [facility] Refill Protocol.    Asthma Maintenance Inhalers - Anticholinergics [id number]/[id number]/2024 01:07 AM   Protocol Details Patient is age 12 years or older    Recent (12 mo) or future (30 days) visit within the authorizing provider's specialty    Medication is active on med list   Short-Acting Beta Agonist Inhalers Protocol  Passed   Protocol Details Patient is age 12 or older    Recent (12 mo) or future (30 days) visit within the authorizing provider's specialty    Medication is active on med list        Jordin RN, BSN

## 2024-02-09 ENCOUNTER — OFFICE VISIT (OUTPATIENT)
Dept: FAMILY MEDICINE | Facility: CLINIC | Age: 39
End: 2024-02-09
Payer: COMMERCIAL

## 2024-02-09 VITALS
DIASTOLIC BLOOD PRESSURE: 93 MMHG | TEMPERATURE: 98.2 F | RESPIRATION RATE: 16 BRPM | HEIGHT: 77 IN | WEIGHT: 315 LBS | OXYGEN SATURATION: 97 % | SYSTOLIC BLOOD PRESSURE: 132 MMHG | HEART RATE: 90 BPM | BODY MASS INDEX: 37.19 KG/M2

## 2024-02-09 DIAGNOSIS — T63.441D BEE STING REACTION, ACCIDENTAL OR UNINTENTIONAL, SUBSEQUENT ENCOUNTER: ICD-10-CM

## 2024-02-09 DIAGNOSIS — R03.0 ELEVATED BLOOD PRESSURE READING WITHOUT DIAGNOSIS OF HYPERTENSION: Primary | ICD-10-CM

## 2024-02-09 DIAGNOSIS — J45.909 REACTIVE AIRWAY DISEASE WITHOUT COMPLICATION, UNSPECIFIED ASTHMA SEVERITY, UNSPECIFIED WHETHER PERSISTENT: ICD-10-CM

## 2024-02-09 PROCEDURE — 99214 OFFICE O/P EST MOD 30 MIN: CPT | Mod: GC

## 2024-02-09 RX ORDER — EPINEPHRINE 0.3 MG/.3ML
0.3 INJECTION SUBCUTANEOUS PRN
Qty: 2 EACH | Refills: 3 | Status: SHIPPED | OUTPATIENT
Start: 2024-02-09

## 2024-02-09 ASSESSMENT — ASTHMA QUESTIONNAIRES
QUESTION_2 LAST FOUR WEEKS HOW OFTEN HAVE YOU HAD SHORTNESS OF BREATH: NOT AT ALL
QUESTION_1 LAST FOUR WEEKS HOW MUCH OF THE TIME DID YOUR ASTHMA KEEP YOU FROM GETTING AS MUCH DONE AT WORK, SCHOOL OR AT HOME: NONE OF THE TIME
ACT_TOTALSCORE: 25
QUESTION_3 LAST FOUR WEEKS HOW OFTEN DID YOUR ASTHMA SYMPTOMS (WHEEZING, COUGHING, SHORTNESS OF BREATH, CHEST TIGHTNESS OR PAIN) WAKE YOU UP AT NIGHT OR EARLIER THAN USUAL IN THE MORNING: NOT AT ALL
ACT_TOTALSCORE: 25
QUESTION_4 LAST FOUR WEEKS HOW OFTEN HAVE YOU USED YOUR RESCUE INHALER OR NEBULIZER MEDICATION (SUCH AS ALBUTEROL): NOT AT ALL
QUESTION_5 LAST FOUR WEEKS HOW WOULD YOU RATE YOUR ASTHMA CONTROL: COMPLETELY CONTROLLED

## 2024-02-09 NOTE — PROGRESS NOTES
Assessment & Plan     Elevated blood pressure reading without diagnosis of hypertension  Patient has been checking his blood pressure at home at a consistent time in the morning. Readings are primarily 125-135/80-90. This is consistent with his office blood pressure today of 132/93. We discussed options including continuing to monitor with active lifestyle changes versus potentially starting a medication. Patient would like to work on lifestyle changes first, which is reasonable. I advised that he continue to intermittently monitor his blood pressure and return to clinic for any progressive or acute increases. He is comfortable with this plan. I advised follow-up for preventative visit and blood pressure monitoring in about 6 months, sooner as needed.    Reactive airway disease without complication, unspecified asthma severity, unspecified whether persistent  Patient was able to  Asmanex which has been working well. He has been using it daily. He has a child in , so he has had numerous exposures to viral illnesses. When using the inhaler daily, he has seemed to have good control of cold symptoms with no worsening cough or SOB like he had previously. Also had not needed to use his PRN albuterol inhaler. Advised that he continue using the daily Asmanex at least through the winter as he will likely have many exposures to cough/cold/flu-like illnesses.    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 Out of Epipen. Order placed for new prescription.  - EPINEPHrine (ANY BX GENERIC EQUIV) 0.3 MG/0.3ML injection 2-pack; Inject 0.3 mLs (0.3 mg) into the muscle as needed for anaphylaxis May repeat one time in 5-15 minutes if response to initial dose is inadequate.    Return if symptoms worsen or fail to improve.    Janet   Sunil is a 38 year old, presenting for the following health issues:  Hypertension        2/9/2024      "3:44 PM   Additional Questions   Roomed by Daniel   Accompanied by self     HPI   Patient presenting today for follow-up of elevated blood pressures. Had several clinic blood pressures in borderline range. Had been considering starting medication versus continued monitoring. Elected for further monitoring with home blood pressure cuff. Patient returns today with blood pressures checked in the morning at home at a consistent time. Numbers are largely 125-135/80-90. Denies any headache, chest pain, SOB, LE swelling, lightheadedness, dizziness, changes in vision. He is interested in implementing more lifestyle changes such as through diet or cardiovascular exercise.    Review of Systems  CONSTITUTIONAL: NEGATIVE for fever, chills, change in weight  ENT/MOUTH: NEGATIVE for ear, mouth and throat problems  RESP: NEGATIVE for significant cough or SOB  CV: NEGATIVE for chest pain, palpitations or peripheral edema  NEURO: NEGATIVE for weakness, dizziness or headache      Objective    BP (!) 132/93   Pulse 90   Temp 98.2  F (36.8  C) (Oral)   Resp 16   Ht 1.956 m (6' 5\")   Wt 148.3 kg (327 lb)   SpO2 97%   BMI 38.78 kg/m    Body mass index is 38.78 kg/m .  Physical Exam   GENERAL: alert and no distress  RESP: lungs clear to auscultation - no rales, rhonchi or wheezes  CV: regular rate and rhythm, normal S1 S2, no S3 or S4, no murmur, click or rub, no peripheral edema  MS: no gross musculoskeletal defects noted, no edema  SKIN: no suspicious lesions or rashes  NEURO: normal strength and tone, mentation intact and speech normal  PSYCH: mentation appears normal, affect normal/bright      Signed Electronically by: TASIA FERNANDEZ MD  "

## 2024-04-15 ENCOUNTER — OFFICE VISIT (OUTPATIENT)
Dept: FAMILY MEDICINE | Facility: CLINIC | Age: 39
End: 2024-04-15
Payer: COMMERCIAL

## 2024-04-15 VITALS
TEMPERATURE: 98.8 F | SYSTOLIC BLOOD PRESSURE: 145 MMHG | RESPIRATION RATE: 16 BRPM | OXYGEN SATURATION: 97 % | DIASTOLIC BLOOD PRESSURE: 88 MMHG | BODY MASS INDEX: 39.89 KG/M2 | HEART RATE: 114 BPM | WEIGHT: 315 LBS

## 2024-04-15 DIAGNOSIS — J45.41 MODERATE PERSISTENT ASTHMA WITH EXACERBATION: Primary | ICD-10-CM

## 2024-04-15 PROCEDURE — 99214 OFFICE O/P EST MOD 30 MIN: CPT | Mod: GC

## 2024-04-15 RX ORDER — MONTELUKAST SODIUM 10 MG/1
10 TABLET ORAL AT BEDTIME
Qty: 30 TABLET | Refills: 1 | Status: SHIPPED | OUTPATIENT
Start: 2024-04-15 | End: 2024-04-15

## 2024-04-15 RX ORDER — PREDNISONE 20 MG/1
40 TABLET ORAL DAILY
Qty: 10 TABLET | Refills: 0 | Status: SHIPPED | OUTPATIENT
Start: 2024-04-15 | End: 2024-04-20

## 2024-04-15 NOTE — PROGRESS NOTES
"I was present with the medical student, Samuel Boyle, MS3, who participated in the service and the documentation of the note.  I have verified the history and personally performed a physical exam and medical decision making, and have verified the content of the note.  I agree with the assessment and plan of care as documented in the note.    Jennifer Deluca MD PGY-3  [facility]  4/25/2024    Assessment & Plan     Mild intermittent asthma with exacerbation  Presents with productive cough for 2 weeks. Son at home with hand foot and mouth disease and subsequent pneumonia, now resolved. Does not need rescue inhaler at baseline, now using it 4x/day. Symptoms follow patient's overall asthma pattern, worse with activity and cold exposure. Most likely etiology is asthma exacerbation in the setting of URI. Less concern for pneumonia given no fevers, no crackles or evidence of consolidation on exam.  - predniSONE (DELTASONE) 20 MG tablet; Take 2 tablets (40 mg) by mouth daily for 5 days  - Continue OTC Zyrtec    Elevated BP  BP today 145/88. Has had elevated BP on last several clinic visits. At last visit discussed medication vs lifestyle changes and he is more interested in managing with lifestyle modifications at this time. Has not been measuring BP at home in the past 2 months but prior to that has been in the 130s on home monitor. Discussed home BP measurement and continuing diet/exercise changes for now.    BMI  Estimated body mass index is 39.89 kg/m  as calculated from the following:    Height as of 2/9/24: 1.956 m (6' 5\").    Weight as of this encounter: 152.6 kg (336 lb 6.4 oz).     No follow-ups on file.    Janet Barber is a 38 year old, presenting for the following health issues:  Other (Cough for the past 1-2 weeks. Son hand hand foot and mouth and pneumonia )    HPI   Asif Butts is a 38 year old male who presents for evaluation of productive cough for the past 2 weeks. His " son contracted hand foot and mouth disease from  and was hospitalized for pneumonia. Asif's cough and congestion began about a week into his son's illness. He has had no fever, chills, or sore throat. At baseline he uses only a maintenance inhaler for his asthma. For the past 2 weeks has been using his rescue albuterol inhaler 4 times a day. Feels that this improves his symptoms temporarily but the cough returns after a few hours. Cough is worse with activity and exacerbated by cold. No shortness of breath, wheezing, lightheadedness/dizziness, syncope. States that he has had a lingering cough the last several times he was sick, once was given a course of oral steroids.    Review of Systems  Constitutional, HEENT, cardiovascular, pulmonary, gi and gu systems are negative, except as otherwise noted.      Objective    BP (!) 145/88   Pulse 114   Temp 98.8  F (37.1  C) (Oral)   Resp 16   Wt (!) 152.6 kg (336 lb 6.4 oz)   SpO2 97%   BMI 39.89 kg/m    Body mass index is 39.89 kg/m .  Physical Exam   GENERAL: alert and no distress  RESP: coarse breath sounds in R lung, L lung clear to auscultation, no rhonchi or wheezes, breathing comfortably on RA  CV: regular rate and rhythm, normal S1 S2, no murmur, click or rub, no peripheral edema  ABDOMEN: soft, nontender  MS: no gross musculoskeletal defects noted, no edema    No results found for this or any previous visit (from the past 24 hour(s)).    Samuel Boyle, MS3  [facility] [employer]        Signed Electronically by: Jennifer Deluca MD

## 2024-04-15 NOTE — PROGRESS NOTES
Preceptor Attestation:    I discussed the patient with the resident and evaluated the patient in person. I have verified the content of the note, which accurately reflects my assessment of the patient and the plan of care.   Supervising Physician:  Lopez Mac MD.

## 2024-05-19 ENCOUNTER — HEALTH MAINTENANCE LETTER (OUTPATIENT)
Age: 39
End: 2024-05-19

## 2024-07-22 ENCOUNTER — HOSPITAL ENCOUNTER (EMERGENCY)
Facility: HOSPITAL | Age: 39
Discharge: HOME | End: 2024-07-22
Attending: EMERGENCY MEDICINE | Admitting: EMERGENCY MEDICINE
Payer: COMMERCIAL

## 2024-07-22 VITALS
HEART RATE: 83 BPM | TEMPERATURE: 98.4 F | HEIGHT: 77 IN | RESPIRATION RATE: 21 BRPM | BODY MASS INDEX: 37.19 KG/M2 | DIASTOLIC BLOOD PRESSURE: 65 MMHG | OXYGEN SATURATION: 96 % | SYSTOLIC BLOOD PRESSURE: 131 MMHG | WEIGHT: 315 LBS

## 2024-07-22 DIAGNOSIS — T63.444A BEE STING, UNDETERMINED INTENT, INITIAL ENCOUNTER: ICD-10-CM

## 2024-07-22 DIAGNOSIS — Z91.030 BEE STING ALLERGY: ICD-10-CM

## 2024-07-22 PROCEDURE — 99282 EMERGENCY DEPT VISIT SF MDM: CPT

## 2024-07-22 ASSESSMENT — ACTIVITIES OF DAILY LIVING (ADL)
ADLS_ACUITY_SCORE: 35

## 2024-07-22 NOTE — ED PROVIDER NOTES
EMERGENCY DEPARTMENT ENCOUNTER      NAME: Sunil Butts  AGE: 39 year old male  YOB: 1985  MRN: [id number]  EVALUATION DATE & TIME: 7/22/2024  1:41 PM    PCP: Anita Reynoso    ED PROVIDER: Alex Vo M.D.      Chief Complaint   Patient presents with    Insect Bite    Allergic Reaction         FINAL IMPRESSION:  1. Bee sting allergy    2. Bee sting, undetermined intent, initial encounter          ED COURSE & MEDICAL DECISION MAKING:    Pertinent Labs & Imaging studies reviewed below.  All EKGs below represent my independent interpretation.   ED Course as of 07/22/24 2120 Mon Jul 22, 2024   1359 The pt is a 38 yo M who presents after bee sting to right 2nd toe. Hx of anaphylaxis in childhood. No current allergic reaction sx, but due to his hx he self administered epipen to R thigh and presented here. On arrival he has no facial swelling, breathing or swallowing difficulties. No SOB or wheezing. NO hives. Hypertensive and tachycardic, likely from epinephrine. Plan to monitor closely for 3 hours to see if any systemic signs of allergic reaction develop   1647 Pt is symptom free. Will discharge. He has refill for epipen.       Additional ED Course Timestamps:  1:43 PM I met with the patient, obtained history, performed an initial exam, and discussed options and plan for diagnostics and treatment here in the ED.     Medical Decision Making  Obtained supplemental history:Supplemental history obtained?: Documented in chart  Reviewed external records: External records reviewed?: Documented in chart  Care impacted by chronic illness:Chronic Lung Disease  Care significantly affected by social determinants of health:N/A  Did you consider but not order tests?: Work up considered but not performed and documented in chart, if applicable  Did you interpret images independently?: Independent interpretation of ECG and images noted in documentation, when applicable.  Consultation discussion  "with other provider: Phone conversation with consultants will be documented in the ED Course  Discharge. No recommendations on prescription strength medication(s). N/A.      At the conclusion of the encounter I discussed the results of all of the tests and the disposition. The questions were answered. The patient or family acknowledged understanding and was agreeable with the care plan.       MEDICATIONS GIVEN IN THE EMERGENCY:  Medications - No data to display      NEW PRESCRIPTIONS STARTED AT TODAY'S ER VISIT  Discharge Medication List as of 7/22/2024  5:06 PM             =================================================================    HPI    Sunil Butts is a 39 year old male who presents to this ED for evaluation of a bee sting.    Patient reports he was stung by a bee on his right second toe 10-15 minutes prior to arrival. He used his epi-pen on his right thigh right away before feeling any allergic reaction. Patient was stung by an bee in his youth and his throat swelled.  Patient denies any hives.      VITALS:  /65   Pulse 83   Temp 98.4  F (36.9  C) (Oral)   Resp 21   Ht 1.956 m (6' 5\")   Wt 145.2 kg (320 lb)   SpO2 96%   BMI 37.95 kg/m      PHYSICAL EXAM    Constitutional: Well developed, well nourished. uncomfortable appearing.  HENT: Atraumatic, mucous membranes moist, nose normal. No facial, tongue swelling. Normal phonation. Neck- Supple, gross ROM intact.   Eyes: Pupils mid-range, conjunctiva without injection, no discharge.   Respiratory: Clear to auscultation bilaterally, no respiratory distress, no wheezing, speaks full sentences easily. No cough.  Cardiovascular: Normal heart rate, regular rhythm, no murmurs.   GI: Soft, no tenderness to deep palpation in all quadrants, no masses.  Musculoskeletal: Moving all 4 extremities intentionally and without pain. No obvious deformity.  Skin: Warm, dry, no rash. Tiny injection site puncture in right thigh.  Neurologic: Alert & oriented " x 3, cranial nerves grossly intact.  Psychiatric: Anxious, cooperative.        I, Leeann Fisher am serving as a scribe to document services personally performed by Dr. Alex Vo based on my observation and the provider's statements to me. I, Alex Vo MD attest that Leeannyazmin Fisher is acting in a scribe capacity, has observed my performance of the services and has documented them in accordance with my direction.    Alex Vo M.D.  Emergency Medicine  [facility] EMERGENCY DEPARTMENT  [address] [place] [id number]  [phone]  Dept: [phone]       Alex Vo MD  07/22/24 2121

## 2024-07-22 NOTE — ED TRIAGE NOTES
Pt stung by bee on R foot. Had an anaphylactic reaction to a bee sting as a child. Took epi pen after stung to R thigh. Pt denies dizziness, swelling, SOB, chest pain, nausea. VSS     Triage Assessment (Adult)       Row Name 07/22/24 1350          Triage Assessment    Airway WDL WDL        Respiratory WDL    Respiratory WDL WDL        Skin Circulation/Temperature WDL    Skin Circulation/Temperature WDL WDL        Cardiac WDL    Cardiac WDL WDL        Peripheral/Neurovascular WDL    Peripheral Neurovascular WDL WDL        Cognitive/Neuro/Behavioral WDL    Cognitive/Neuro/Behavioral WDL WDL

## 2024-07-22 NOTE — ED NOTES
Patient has no sx of allergic reaction. Denies SOB, itchiness, swelling or hives. He is AO, no redness or hives noted. Tongue and throat are of normal appearance. Provider notified

## 2024-09-06 ENCOUNTER — OFFICE VISIT (OUTPATIENT)
Dept: FAMILY MEDICINE | Facility: CLINIC | Age: 39
End: 2024-09-06
Payer: COMMERCIAL

## 2024-09-06 VITALS
DIASTOLIC BLOOD PRESSURE: 97 MMHG | WEIGHT: 315 LBS | RESPIRATION RATE: 16 BRPM | OXYGEN SATURATION: 96 % | BODY MASS INDEX: 37.19 KG/M2 | HEIGHT: 77 IN | HEART RATE: 76 BPM | SYSTOLIC BLOOD PRESSURE: 148 MMHG | TEMPERATURE: 98.1 F

## 2024-09-06 DIAGNOSIS — R10.13 EPIGASTRIC PAIN: ICD-10-CM

## 2024-09-06 DIAGNOSIS — R10.11 RUQ ABDOMINAL PAIN: Primary | ICD-10-CM

## 2024-09-06 DIAGNOSIS — R03.0 ELEVATED BLOOD PRESSURE READING WITHOUT DIAGNOSIS OF HYPERTENSION: ICD-10-CM

## 2024-09-06 LAB
ALBUMIN SERPL BCG-MCNC: 4.5 G/DL (ref 3.5–5.2)
ALP SERPL-CCNC: 61 U/L (ref 40–150)
ALT SERPL W P-5'-P-CCNC: 18 U/L (ref 0–70)
ANION GAP SERPL CALCULATED.3IONS-SCNC: 8 MMOL/L (ref 7–15)
AST SERPL W P-5'-P-CCNC: 26 U/L (ref 0–45)
BILIRUB SERPL-MCNC: 0.8 MG/DL
BUN SERPL-MCNC: 10.8 MG/DL (ref 6–20)
CALCIUM SERPL-MCNC: 8.8 MG/DL (ref 8.8–10.4)
CHLORIDE SERPL-SCNC: 105 MMOL/L (ref 98–107)
CREAT SERPL-MCNC: 0.91 MG/DL (ref 0.67–1.17)
EGFRCR SERPLBLD CKD-EPI 2021: >90 ML/MIN/1.73M2
GLUCOSE SERPL-MCNC: 95 MG/DL (ref 70–99)
HCO3 SERPL-SCNC: 26 MMOL/L (ref 22–29)
LIPASE SERPL-CCNC: 22 U/L (ref 13–60)
POTASSIUM SERPL-SCNC: 3.9 MMOL/L (ref 3.4–5.3)
PROT SERPL-MCNC: 7.4 G/DL (ref 6.4–8.3)
SODIUM SERPL-SCNC: 139 MMOL/L (ref 135–145)

## 2024-09-06 PROCEDURE — 90471 IMMUNIZATION ADMIN: CPT

## 2024-09-06 PROCEDURE — 90677 PCV20 VACCINE IM: CPT

## 2024-09-06 PROCEDURE — 90656 IIV3 VACC NO PRSV 0.5 ML IM: CPT

## 2024-09-06 PROCEDURE — 83690 ASSAY OF LIPASE: CPT

## 2024-09-06 PROCEDURE — 99213 OFFICE O/P EST LOW 20 MIN: CPT | Mod: 25

## 2024-09-06 PROCEDURE — 90472 IMMUNIZATION ADMIN EACH ADD: CPT

## 2024-09-06 PROCEDURE — 80053 COMPREHEN METABOLIC PANEL: CPT

## 2024-09-06 PROCEDURE — 36415 COLL VENOUS BLD VENIPUNCTURE: CPT

## 2024-09-06 ASSESSMENT — ASTHMA QUESTIONNAIRES
QUESTION_1 LAST FOUR WEEKS HOW MUCH OF THE TIME DID YOUR ASTHMA KEEP YOU FROM GETTING AS MUCH DONE AT WORK, SCHOOL OR AT HOME: NONE OF THE TIME
ACT_TOTALSCORE: 24
ACT_TOTALSCORE: 24
QUESTION_3 LAST FOUR WEEKS HOW OFTEN DID YOUR ASTHMA SYMPTOMS (WHEEZING, COUGHING, SHORTNESS OF BREATH, CHEST TIGHTNESS OR PAIN) WAKE YOU UP AT NIGHT OR EARLIER THAN USUAL IN THE MORNING: NOT AT ALL
QUESTION_5 LAST FOUR WEEKS HOW WOULD YOU RATE YOUR ASTHMA CONTROL: WELL CONTROLLED
QUESTION_4 LAST FOUR WEEKS HOW OFTEN HAVE YOU USED YOUR RESCUE INHALER OR NEBULIZER MEDICATION (SUCH AS ALBUTEROL): NOT AT ALL
QUESTION_2 LAST FOUR WEEKS HOW OFTEN HAVE YOU HAD SHORTNESS OF BREATH: NOT AT ALL

## 2024-09-06 NOTE — PROGRESS NOTES
Prior to immunization administration, verified patients identity using patient s name and date of birth. Please see Immunization Activity for additional information.     Screening Questionnaire for Adult Immunization    Are you sick today?   No   Do you have allergies to medications, food, a vaccine component or latex?   No   Have you ever had a serious reaction after receiving a vaccination?   No   Do you have a long-term health problem with heart, lung, kidney, or metabolic disease (e.g., diabetes), asthma, a blood disorder, no spleen, complement component deficiency, a cochlear implant, or a spinal fluid leak?  Are you on long-term aspirin therapy?   Yes   Do you have cancer, leukemia, HIV/AIDS, or any other immune system problem?   No   Do you have a parent, brother, or sister with an immune system problem?   No   In the past 3 months, have you taken medications that affect  your immune system, such as prednisone, other steroids, or anticancer drugs; drugs for the treatment of rheumatoid arthritis, Crohn s disease, or psoriasis; or have you had radiation treatments?   No   Have you had a seizure, or a brain or other nervous system problem?   No   During the past year, have you received a transfusion of blood or blood    products, or been given immune (gamma) globulin or antiviral drug?   No   For women: Are you pregnant or is there a chance you could become       pregnant during the next month?   No   Have you received any vaccinations in the past 4 weeks?   No     Immunization questionnaire was positive for at least one answer.  Notified Dr. Reynoso.      Patient instructed to remain in clinic for 15 minutes afterwards, and to report any adverse reactions.     Screening performed by Elyse Dutta MA on 9/6/2024 at 10:19 AM.

## 2024-09-06 NOTE — PROGRESS NOTES
Assessment & Plan     RUQ abdominal pain  Epigastric pain  Approximately 1.5-year history of intermittent abdominal pain coming every couple of months.  Pain typically comes on after fatty meal and lasts about an hour.  Does have some associated nausea, maybe 1 episode of vomiting.  Most recent episode was 2 days ago.  Denies any issues with diarrhea or constipation.  No significant findings on abdominal exam today, no tenderness to palpation.  Patient describes pain as being in the area of the epigastrium and sometimes radiating to his back or right shoulder.  Will workup for possible gallbladder etiology and include lipase given location of pain in the epigastrium.  - US Abdomen Limited  - Comprehensive metabolic panel  - Lipase    Elevated blood pressure reading without diagnosis of hypertension  Patient has had multiple high blood pressure readings in a clinic setting.  He has not been consistently checking his blood pressure at home.  He has preferred to hold off on starting medication in favor of lifestyle changes.  - Monitor blood pressure daily at home and send in a log in ~2 weeks  - Discussed recommendation for blood pressure medication if numbers at home are also remaining elevate    Follow-up plan regarding intermittent abdominal pain is pending results of labs and RUQ US. Advised patient to resume checking blood pressures daily at home and sending numbers in in ~2 weeks to determine further follow-up plan.     Subjective   Sunil is a 39 year old, presenting for the following health issues:  Abdominal Pain (Per pt occ would have abd pain- unsure if it's gallbladder- pain going to the back. More severe in the last few days)      9/6/2024     9:32 AM   Additional Questions   Roomed by Mao   Accompanied by self         9/6/2024    Information    services provided? No        HPI   Patient presenting today for evaluation of intermittent abdominal pain.  This has been ongoing for the  "past year and a half and coming every couple of months. Normally comes on after eating something was healthy.  Last for about an hour.  Warm showers help most for symptom relief.  Pain sometimes radiates to the back or to the right shoulder.  Initially attributed to heartburn.  However 2 days ago, episode felt more severe with pain and sweating so decided to pursue further workup.  Does sometimes have associated nausea, recalls vomiting once.  Denies diarrhea or constipation and has bowel movement approximately once or twice a day.  No issues with urination.  No history of kidney stone or UTI.        Objective    BP (!) 148/97   Pulse 76   Temp 98.1  F (36.7  C) (Oral)   Resp 16   Ht 1.956 m (6' 5\")   Wt (!) 156.2 kg (344 lb 6.4 oz)   SpO2 96%   BMI 40.84 kg/m    Body mass index is 40.84 kg/m .  Physical Exam   GENERAL: alert and no distress  NECK: no asymmetry, masses, or scars  RESP: lungs clear to auscultation - no rales, rhonchi or wheezes  CV: regular rate and rhythm, normal S1 S2, no S3 or S4, no murmur  ABDOMEN: soft, nontender, no masses and bowel sounds normal  MS: no gross musculoskeletal defects noted, no edema  SKIN: no suspicious lesions or rashes on exposed skin  NEURO: normal strength and tone, mentation intact and speech normal  BACK: no CVA tenderness  PSYCH: mentation appears normal, affect normal/bright          Signed Electronically by: TASIA FERNANDEZ MD    "

## 2024-09-09 ENCOUNTER — HOSPITAL ENCOUNTER (OUTPATIENT)
Dept: ULTRASOUND IMAGING | Facility: HOSPITAL | Age: 39
Discharge: HOME | End: 2024-09-09
Payer: COMMERCIAL

## 2024-09-09 ENCOUNTER — HOSPITAL ENCOUNTER (OUTPATIENT)
Facility: HOSPITAL | Age: 39
Setting detail: OBSERVATION
Discharge: HOME | End: 2024-09-11
Attending: STUDENT IN AN ORGANIZED HEALTH CARE EDUCATION/TRAINING PROGRAM | Admitting: SURGERY
Payer: COMMERCIAL

## 2024-09-09 DIAGNOSIS — R10.11 RUQ ABDOMINAL PAIN: ICD-10-CM

## 2024-09-09 DIAGNOSIS — R10.13 EPIGASTRIC PAIN: ICD-10-CM

## 2024-09-09 DIAGNOSIS — K57.32 DIVERTICULITIS OF COLON: ICD-10-CM

## 2024-09-09 DIAGNOSIS — K80.20 SYMPTOMATIC CHOLELITHIASIS: Primary | ICD-10-CM

## 2024-09-09 DIAGNOSIS — K80.20 CHOLELITHIASES: ICD-10-CM

## 2024-09-09 PROCEDURE — 99285 EMERGENCY DEPT VISIT HI MDM: CPT | Mod: 25

## 2024-09-09 PROCEDURE — 96375 TX/PRO/DX INJ NEW DRUG ADDON: CPT

## 2024-09-09 PROCEDURE — 76705 ECHO EXAM OF ABDOMEN: CPT

## 2024-09-09 PROCEDURE — 96374 THER/PROPH/DIAG INJ IV PUSH: CPT

## 2024-09-09 PROCEDURE — 96376 TX/PRO/DX INJ SAME DRUG ADON: CPT

## 2024-09-09 ASSESSMENT — COLUMBIA-SUICIDE SEVERITY RATING SCALE - C-SSRS
1. IN THE PAST MONTH, HAVE YOU WISHED YOU WERE DEAD OR WISHED YOU COULD GO TO SLEEP AND NOT WAKE UP?: NO
6. HAVE YOU EVER DONE ANYTHING, STARTED TO DO ANYTHING, OR PREPARED TO DO ANYTHING TO END YOUR LIFE?: NO
2. HAVE YOU ACTUALLY HAD ANY THOUGHTS OF KILLING YOURSELF IN THE PAST MONTH?: NO

## 2024-09-10 ENCOUNTER — APPOINTMENT (OUTPATIENT)
Dept: ULTRASOUND IMAGING | Facility: HOSPITAL | Age: 39
End: 2024-09-10
Attending: STUDENT IN AN ORGANIZED HEALTH CARE EDUCATION/TRAINING PROGRAM
Payer: COMMERCIAL

## 2024-09-10 ENCOUNTER — APPOINTMENT (OUTPATIENT)
Dept: CT IMAGING | Facility: HOSPITAL | Age: 39
End: 2024-09-10
Attending: STUDENT IN AN ORGANIZED HEALTH CARE EDUCATION/TRAINING PROGRAM
Payer: COMMERCIAL

## 2024-09-10 ENCOUNTER — ANESTHESIA (OUTPATIENT)
Dept: SURGERY | Facility: HOSPITAL | Age: 39
End: 2024-09-10
Payer: COMMERCIAL

## 2024-09-10 ENCOUNTER — ANESTHESIA EVENT (OUTPATIENT)
Dept: SURGERY | Facility: HOSPITAL | Age: 39
End: 2024-09-10
Payer: COMMERCIAL

## 2024-09-10 PROBLEM — K57.32 DIVERTICULITIS OF COLON: Status: ACTIVE | Noted: 2024-09-10

## 2024-09-10 PROBLEM — K80.20 SYMPTOMATIC CHOLELITHIASIS: Status: ACTIVE | Noted: 2024-09-10

## 2024-09-10 LAB
ALBUMIN SERPL BCG-MCNC: 4.5 G/DL (ref 3.5–5.2)
ALBUMIN UR-MCNC: 30 MG/DL
ALP SERPL-CCNC: 76 U/L (ref 40–150)
ALT SERPL W P-5'-P-CCNC: 28 U/L (ref 0–70)
AMORPH CRY #/AREA URNS HPF: ABNORMAL /HPF
ANION GAP SERPL CALCULATED.3IONS-SCNC: 14 MMOL/L (ref 7–15)
APPEARANCE UR: CLEAR
AST SERPL W P-5'-P-CCNC: 26 U/L (ref 0–45)
BASOPHILS # BLD AUTO: 0.1 10E3/UL (ref 0–0.2)
BASOPHILS NFR BLD AUTO: 0 %
BILIRUB DIRECT SERPL-MCNC: 0.28 MG/DL (ref 0–0.3)
BILIRUB SERPL-MCNC: 0.9 MG/DL
BILIRUB UR QL STRIP: NEGATIVE
BUN SERPL-MCNC: 13.1 MG/DL (ref 6–20)
CALCIUM SERPL-MCNC: 8.9 MG/DL (ref 8.8–10.4)
CHLORIDE SERPL-SCNC: 103 MMOL/L (ref 98–107)
COLOR UR AUTO: YELLOW
CREAT SERPL-MCNC: 0.8 MG/DL (ref 0.67–1.17)
CREAT SERPL-MCNC: 0.86 MG/DL (ref 0.67–1.17)
EGFRCR SERPLBLD CKD-EPI 2021: >90 ML/MIN/1.73M2
EGFRCR SERPLBLD CKD-EPI 2021: >90 ML/MIN/1.73M2
EOSINOPHIL # BLD AUTO: 0.1 10E3/UL (ref 0–0.7)
EOSINOPHIL NFR BLD AUTO: 1 %
ERYTHROCYTE [DISTWIDTH] IN BLOOD BY AUTOMATED COUNT: 12.4 % (ref 10–15)
GLUCOSE SERPL-MCNC: 95 MG/DL (ref 70–99)
GLUCOSE UR STRIP-MCNC: NEGATIVE MG/DL
HCO3 SERPL-SCNC: 23 MMOL/L (ref 22–29)
HCT VFR BLD AUTO: 44.4 % (ref 40–53)
HGB BLD-MCNC: 15.5 G/DL (ref 13.3–17.7)
HGB UR QL STRIP: ABNORMAL
HOLD SPECIMEN: NORMAL
IMM GRANULOCYTES # BLD: 0 10E3/UL
IMM GRANULOCYTES NFR BLD: 0 %
KETONES UR STRIP-MCNC: 150 MG/DL
LEUKOCYTE ESTERASE UR QL STRIP: NEGATIVE
LIPASE SERPL-CCNC: 18 U/L (ref 13–60)
LYMPHOCYTES # BLD AUTO: 1.6 10E3/UL (ref 0.8–5.3)
LYMPHOCYTES NFR BLD AUTO: 13 %
MCH RBC QN AUTO: 30 PG (ref 26.5–33)
MCHC RBC AUTO-ENTMCNC: 34.9 G/DL (ref 31.5–36.5)
MCV RBC AUTO: 86 FL (ref 78–100)
MONOCYTES # BLD AUTO: 1.2 10E3/UL (ref 0–1.3)
MONOCYTES NFR BLD AUTO: 9 %
MUCOUS THREADS #/AREA URNS LPF: PRESENT /LPF
NEUTROPHILS # BLD AUTO: 9.4 10E3/UL (ref 1.6–8.3)
NEUTROPHILS NFR BLD AUTO: 76 %
NITRATE UR QL: NEGATIVE
NRBC # BLD AUTO: 0 10E3/UL
NRBC BLD AUTO-RTO: 0 /100
PH UR STRIP: 5.5 [PH] (ref 5–7)
PLATELET # BLD AUTO: 205 10E3/UL (ref 150–450)
POTASSIUM SERPL-SCNC: 3.8 MMOL/L (ref 3.4–5.3)
PROT SERPL-MCNC: 7.6 G/DL (ref 6.4–8.3)
RBC # BLD AUTO: 5.17 10E6/UL (ref 4.4–5.9)
RBC URINE: 2 /HPF
SODIUM SERPL-SCNC: 140 MMOL/L (ref 135–145)
SP GR UR STRIP: 1.03 (ref 1–1.03)
UROBILINOGEN UR STRIP-MCNC: <2 MG/DL
WBC # BLD AUTO: 12.4 10E3/UL (ref 4–11)
WBC URINE: 1 /HPF

## 2024-09-10 PROCEDURE — 88304 TISSUE EXAM BY PATHOLOGIST: CPT | Mod: 26 | Performed by: PATHOLOGY

## 2024-09-10 PROCEDURE — 360N000076 HC SURGERY LEVEL 3, PER MIN: Performed by: SURGERY

## 2024-09-10 PROCEDURE — 250N000009 HC RX 250: Performed by: NURSE ANESTHETIST, CERTIFIED REGISTERED

## 2024-09-10 PROCEDURE — 99140 ANES COMP EMERGENCY COND: CPT | Performed by: NURSE ANESTHETIST, CERTIFIED REGISTERED

## 2024-09-10 PROCEDURE — 250N000011 HC RX IP 250 OP 636: Performed by: SURGERY

## 2024-09-10 PROCEDURE — 36415 COLL VENOUS BLD VENIPUNCTURE: CPT | Performed by: SURGERY

## 2024-09-10 PROCEDURE — 250N000011 HC RX IP 250 OP 636: Performed by: STUDENT IN AN ORGANIZED HEALTH CARE EDUCATION/TRAINING PROGRAM

## 2024-09-10 PROCEDURE — 258N000003 HC RX IP 258 OP 636: Performed by: SURGERY

## 2024-09-10 PROCEDURE — 81001 URINALYSIS AUTO W/SCOPE: CPT | Performed by: STUDENT IN AN ORGANIZED HEALTH CARE EDUCATION/TRAINING PROGRAM

## 2024-09-10 PROCEDURE — 258N000003 HC RX IP 258 OP 636: Performed by: ANESTHESIOLOGY

## 2024-09-10 PROCEDURE — 47562 LAPAROSCOPIC CHOLECYSTECTOMY: CPT | Performed by: ANESTHESIOLOGY

## 2024-09-10 PROCEDURE — 85025 COMPLETE CBC W/AUTO DIFF WBC: CPT | Performed by: STUDENT IN AN ORGANIZED HEALTH CARE EDUCATION/TRAINING PROGRAM

## 2024-09-10 PROCEDURE — 36415 COLL VENOUS BLD VENIPUNCTURE: CPT | Performed by: STUDENT IN AN ORGANIZED HEALTH CARE EDUCATION/TRAINING PROGRAM

## 2024-09-10 PROCEDURE — 99204 OFFICE O/P NEW MOD 45 MIN: CPT | Mod: FS | Performed by: SURGERY

## 2024-09-10 PROCEDURE — 83690 ASSAY OF LIPASE: CPT | Performed by: STUDENT IN AN ORGANIZED HEALTH CARE EDUCATION/TRAINING PROGRAM

## 2024-09-10 PROCEDURE — 76705 ECHO EXAM OF ABDOMEN: CPT

## 2024-09-10 PROCEDURE — 370N000017 HC ANESTHESIA TECHNICAL FEE, PER MIN: Performed by: SURGERY

## 2024-09-10 PROCEDURE — 96376 TX/PRO/DX INJ SAME DRUG ADON: CPT | Mod: XU

## 2024-09-10 PROCEDURE — 710N000009 HC RECOVERY PHASE 1, LEVEL 1, PER MIN: Performed by: SURGERY

## 2024-09-10 PROCEDURE — 999N000141 HC STATISTIC PRE-PROCEDURE NURSING ASSESSMENT: Performed by: SURGERY

## 2024-09-10 PROCEDURE — 250N000011 HC RX IP 250 OP 636: Performed by: ANESTHESIOLOGY

## 2024-09-10 PROCEDURE — 47562 LAPAROSCOPIC CHOLECYSTECTOMY: CPT | Performed by: NURSE ANESTHETIST, CERTIFIED REGISTERED

## 2024-09-10 PROCEDURE — 258N000003 HC RX IP 258 OP 636: Performed by: NURSE ANESTHETIST, CERTIFIED REGISTERED

## 2024-09-10 PROCEDURE — 88304 TISSUE EXAM BY PATHOLOGIST: CPT | Mod: TC | Performed by: SURGERY

## 2024-09-10 PROCEDURE — 96375 TX/PRO/DX INJ NEW DRUG ADDON: CPT

## 2024-09-10 PROCEDURE — 74176 CT ABD & PELVIS W/O CONTRAST: CPT

## 2024-09-10 PROCEDURE — 47562 LAPAROSCOPIC CHOLECYSTECTOMY: CPT | Performed by: SURGERY

## 2024-09-10 PROCEDURE — 80053 COMPREHEN METABOLIC PANEL: CPT | Performed by: STUDENT IN AN ORGANIZED HEALTH CARE EDUCATION/TRAINING PROGRAM

## 2024-09-10 PROCEDURE — 250N000011 HC RX IP 250 OP 636: Performed by: NURSE ANESTHETIST, CERTIFIED REGISTERED

## 2024-09-10 PROCEDURE — 250N000025 HC SEVOFLURANE, PER MIN: Performed by: SURGERY

## 2024-09-10 PROCEDURE — 99207 PR NO BILLABLE SERVICE THIS VISIT: CPT | Performed by: PHYSICIAN ASSISTANT

## 2024-09-10 PROCEDURE — 250N000013 HC RX MED GY IP 250 OP 250 PS 637: Performed by: ANESTHESIOLOGY

## 2024-09-10 PROCEDURE — 272N000001 HC OR GENERAL SUPPLY STERILE: Performed by: SURGERY

## 2024-09-10 PROCEDURE — 82565 ASSAY OF CREATININE: CPT | Mod: 91 | Performed by: SURGERY

## 2024-09-10 PROCEDURE — 250N000013 HC RX MED GY IP 250 OP 250 PS 637: Performed by: SURGERY

## 2024-09-10 RX ORDER — LABETALOL HYDROCHLORIDE 5 MG/ML
10 INJECTION, SOLUTION INTRAVENOUS
Status: COMPLETED | OUTPATIENT
Start: 2024-09-10 | End: 2024-09-10

## 2024-09-10 RX ORDER — PIPERACILLIN SODIUM, TAZOBACTAM SODIUM 3; .375 G/15ML; G/15ML
3.38 INJECTION, POWDER, LYOPHILIZED, FOR SOLUTION INTRAVENOUS ONCE
Status: COMPLETED | OUTPATIENT
Start: 2024-09-10 | End: 2024-09-10

## 2024-09-10 RX ORDER — FENTANYL CITRATE 50 UG/ML
INJECTION, SOLUTION INTRAMUSCULAR; INTRAVENOUS PRN
Status: DISCONTINUED | OUTPATIENT
Start: 2024-09-10 | End: 2024-09-10

## 2024-09-10 RX ORDER — AMOXICILLIN 250 MG
1 CAPSULE ORAL 2 TIMES DAILY
Status: DISCONTINUED | OUTPATIENT
Start: 2024-09-10 | End: 2024-09-11 | Stop reason: HOSPADM

## 2024-09-10 RX ORDER — SODIUM CHLORIDE, SODIUM LACTATE, POTASSIUM CHLORIDE, CALCIUM CHLORIDE 600; 310; 30; 20 MG/100ML; MG/100ML; MG/100ML; MG/100ML
INJECTION, SOLUTION INTRAVENOUS CONTINUOUS PRN
Status: DISCONTINUED | OUTPATIENT
Start: 2024-09-10 | End: 2024-09-10

## 2024-09-10 RX ORDER — OXYCODONE HYDROCHLORIDE 5 MG/1
5 TABLET ORAL
Status: DISCONTINUED | OUTPATIENT
Start: 2024-09-10 | End: 2024-09-10

## 2024-09-10 RX ORDER — ONDANSETRON 2 MG/ML
4 INJECTION INTRAMUSCULAR; INTRAVENOUS EVERY 6 HOURS PRN
Status: DISCONTINUED | OUTPATIENT
Start: 2024-09-10 | End: 2024-09-11 | Stop reason: HOSPADM

## 2024-09-10 RX ORDER — DEXMEDETOMIDINE HYDROCHLORIDE 4 UG/ML
INJECTION, SOLUTION INTRAVENOUS
Status: DISCONTINUED
Start: 2024-09-10 | End: 2024-09-10 | Stop reason: HOSPADM

## 2024-09-10 RX ORDER — ONDANSETRON 2 MG/ML
4 INJECTION INTRAMUSCULAR; INTRAVENOUS EVERY 30 MIN PRN
Status: DISCONTINUED | OUTPATIENT
Start: 2024-09-10 | End: 2024-09-10 | Stop reason: HOSPADM

## 2024-09-10 RX ORDER — ACETAMINOPHEN 325 MG/1
975 TABLET ORAL ONCE
Status: COMPLETED | OUTPATIENT
Start: 2024-09-10 | End: 2024-09-10

## 2024-09-10 RX ORDER — ALBUTEROL SULFATE 90 UG/1
2 AEROSOL, METERED RESPIRATORY (INHALATION)
Status: DISCONTINUED | OUTPATIENT
Start: 2024-09-10 | End: 2024-09-10

## 2024-09-10 RX ORDER — CEFAZOLIN SODIUM/WATER 3 G/30 ML
3 SYRINGE (ML) INTRAVENOUS SEE ADMIN INSTRUCTIONS
Status: DISCONTINUED | OUTPATIENT
Start: 2024-09-10 | End: 2024-09-10 | Stop reason: HOSPADM

## 2024-09-10 RX ORDER — ONDANSETRON 2 MG/ML
4 INJECTION INTRAMUSCULAR; INTRAVENOUS EVERY 30 MIN PRN
Status: DISCONTINUED | OUTPATIENT
Start: 2024-09-10 | End: 2024-09-10

## 2024-09-10 RX ORDER — FENTANYL CITRATE 50 UG/ML
25 INJECTION, SOLUTION INTRAMUSCULAR; INTRAVENOUS EVERY 5 MIN PRN
Status: DISCONTINUED | OUTPATIENT
Start: 2024-09-10 | End: 2024-09-10 | Stop reason: HOSPADM

## 2024-09-10 RX ORDER — LIDOCAINE HYDROCHLORIDE 10 MG/ML
INJECTION, SOLUTION INFILTRATION; PERINEURAL PRN
Status: DISCONTINUED | OUTPATIENT
Start: 2024-09-10 | End: 2024-09-10

## 2024-09-10 RX ORDER — NALOXONE HYDROCHLORIDE 0.4 MG/ML
0.1 INJECTION, SOLUTION INTRAMUSCULAR; INTRAVENOUS; SUBCUTANEOUS
Status: DISCONTINUED | OUTPATIENT
Start: 2024-09-10 | End: 2024-09-10

## 2024-09-10 RX ORDER — ONDANSETRON 4 MG/1
4 TABLET, ORALLY DISINTEGRATING ORAL EVERY 6 HOURS PRN
Status: DISCONTINUED | OUTPATIENT
Start: 2024-09-10 | End: 2024-09-11 | Stop reason: HOSPADM

## 2024-09-10 RX ORDER — NALOXONE HYDROCHLORIDE 0.4 MG/ML
0.2 INJECTION, SOLUTION INTRAMUSCULAR; INTRAVENOUS; SUBCUTANEOUS
Status: DISCONTINUED | OUTPATIENT
Start: 2024-09-10 | End: 2024-09-11 | Stop reason: HOSPADM

## 2024-09-10 RX ORDER — IBUPROFEN 200 MG
800 TABLET ORAL EVERY 8 HOURS PRN
COMMUNITY
End: 2024-10-07

## 2024-09-10 RX ORDER — HYDROMORPHONE HCL IN WATER/PF 6 MG/30 ML
0.4 PATIENT CONTROLLED ANALGESIA SYRINGE INTRAVENOUS
Status: DISCONTINUED | OUTPATIENT
Start: 2024-09-10 | End: 2024-09-11

## 2024-09-10 RX ORDER — DEXAMETHASONE SODIUM PHOSPHATE 4 MG/ML
4 INJECTION, SOLUTION INTRA-ARTICULAR; INTRALESIONAL; INTRAMUSCULAR; INTRAVENOUS; SOFT TISSUE
Status: DISCONTINUED | OUTPATIENT
Start: 2024-09-10 | End: 2024-09-10 | Stop reason: HOSPADM

## 2024-09-10 RX ORDER — BUPIVACAINE HYDROCHLORIDE 2.5 MG/ML
INJECTION, SOLUTION INFILTRATION; PERINEURAL PRN
Status: DISCONTINUED | OUTPATIENT
Start: 2024-09-10 | End: 2024-09-10 | Stop reason: HOSPADM

## 2024-09-10 RX ORDER — LIDOCAINE 40 MG/G
CREAM TOPICAL
Status: DISCONTINUED | OUTPATIENT
Start: 2024-09-10 | End: 2024-09-10 | Stop reason: HOSPADM

## 2024-09-10 RX ORDER — ONDANSETRON 4 MG/1
4 TABLET, ORALLY DISINTEGRATING ORAL EVERY 30 MIN PRN
Status: DISCONTINUED | OUTPATIENT
Start: 2024-09-10 | End: 2024-09-10 | Stop reason: HOSPADM

## 2024-09-10 RX ORDER — DEXAMETHASONE SODIUM PHOSPHATE 10 MG/ML
4 INJECTION, SOLUTION INTRAMUSCULAR; INTRAVENOUS
Status: DISCONTINUED | OUTPATIENT
Start: 2024-09-10 | End: 2024-09-10

## 2024-09-10 RX ORDER — PROCHLORPERAZINE MALEATE 10 MG
10 TABLET ORAL EVERY 6 HOURS PRN
Status: DISCONTINUED | OUTPATIENT
Start: 2024-09-10 | End: 2024-09-11 | Stop reason: HOSPADM

## 2024-09-10 RX ORDER — ACETAMINOPHEN 325 MG/1
975 TABLET ORAL EVERY 8 HOURS
Status: DISCONTINUED | OUTPATIENT
Start: 2024-09-10 | End: 2024-09-11 | Stop reason: HOSPADM

## 2024-09-10 RX ORDER — HYDROMORPHONE HCL IN WATER/PF 6 MG/30 ML
0.2 PATIENT CONTROLLED ANALGESIA SYRINGE INTRAVENOUS EVERY 5 MIN PRN
Status: DISCONTINUED | OUTPATIENT
Start: 2024-09-10 | End: 2024-09-10 | Stop reason: HOSPADM

## 2024-09-10 RX ORDER — ONDANSETRON 4 MG/1
4 TABLET, ORALLY DISINTEGRATING ORAL EVERY 30 MIN PRN
Status: DISCONTINUED | OUTPATIENT
Start: 2024-09-10 | End: 2024-09-10

## 2024-09-10 RX ORDER — LIDOCAINE 40 MG/G
CREAM TOPICAL
Status: DISCONTINUED | OUTPATIENT
Start: 2024-09-10 | End: 2024-09-11 | Stop reason: HOSPADM

## 2024-09-10 RX ORDER — ALBUTEROL SULFATE 90 UG/1
2 AEROSOL, METERED RESPIRATORY (INHALATION) EVERY 6 HOURS PRN
Status: DISCONTINUED | OUTPATIENT
Start: 2024-09-10 | End: 2024-09-11 | Stop reason: HOSPADM

## 2024-09-10 RX ORDER — ALBUTEROL SULFATE 0.83 MG/ML
2.5 SOLUTION RESPIRATORY (INHALATION) EVERY 4 HOURS PRN
Status: DISCONTINUED | OUTPATIENT
Start: 2024-09-10 | End: 2024-09-10 | Stop reason: HOSPADM

## 2024-09-10 RX ORDER — CEFAZOLIN SODIUM/WATER 3 G/30 ML
3 SYRINGE (ML) INTRAVENOUS
Status: COMPLETED | OUTPATIENT
Start: 2024-09-10 | End: 2024-09-10

## 2024-09-10 RX ORDER — SODIUM CHLORIDE, SODIUM LACTATE, POTASSIUM CHLORIDE, CALCIUM CHLORIDE 600; 310; 30; 20 MG/100ML; MG/100ML; MG/100ML; MG/100ML
INJECTION, SOLUTION INTRAVENOUS CONTINUOUS
Status: DISCONTINUED | OUTPATIENT
Start: 2024-09-10 | End: 2024-09-10 | Stop reason: HOSPADM

## 2024-09-10 RX ORDER — EPINEPHRINE 0.3 MG/.3ML
0.3 INJECTION SUBCUTANEOUS ONCE
Status: DISCONTINUED | OUTPATIENT
Start: 2024-09-10 | End: 2024-09-10

## 2024-09-10 RX ORDER — SODIUM CHLORIDE, SODIUM LACTATE, POTASSIUM CHLORIDE, AND CALCIUM CHLORIDE .6; .31; .03; .02 G/100ML; G/100ML; G/100ML; G/100ML
IRRIGANT IRRIGATION PRN
Status: DISCONTINUED | OUTPATIENT
Start: 2024-09-10 | End: 2024-09-10 | Stop reason: HOSPADM

## 2024-09-10 RX ORDER — KETOROLAC TROMETHAMINE 15 MG/ML
15 INJECTION, SOLUTION INTRAMUSCULAR; INTRAVENOUS ONCE
Status: COMPLETED | OUTPATIENT
Start: 2024-09-10 | End: 2024-09-10

## 2024-09-10 RX ORDER — DEXMEDETOMIDINE HYDROCHLORIDE 4 UG/ML
INJECTION, SOLUTION INTRAVENOUS CONTINUOUS PRN
Status: DISCONTINUED | OUTPATIENT
Start: 2024-09-10 | End: 2024-09-10

## 2024-09-10 RX ORDER — FENTANYL CITRATE 50 UG/ML
50 INJECTION, SOLUTION INTRAMUSCULAR; INTRAVENOUS EVERY 5 MIN PRN
Status: DISCONTINUED | OUTPATIENT
Start: 2024-09-10 | End: 2024-09-10 | Stop reason: HOSPADM

## 2024-09-10 RX ORDER — POLYETHYLENE GLYCOL 3350 17 G/17G
17 POWDER, FOR SOLUTION ORAL DAILY
Status: DISCONTINUED | OUTPATIENT
Start: 2024-09-11 | End: 2024-09-11 | Stop reason: HOSPADM

## 2024-09-10 RX ORDER — PIPERACILLIN SODIUM, TAZOBACTAM SODIUM 3; .375 G/15ML; G/15ML
3.38 INJECTION, POWDER, LYOPHILIZED, FOR SOLUTION INTRAVENOUS EVERY 8 HOURS
Status: DISCONTINUED | OUTPATIENT
Start: 2024-09-10 | End: 2024-09-10

## 2024-09-10 RX ORDER — NALOXONE HYDROCHLORIDE 0.4 MG/ML
0.4 INJECTION, SOLUTION INTRAMUSCULAR; INTRAVENOUS; SUBCUTANEOUS
Status: DISCONTINUED | OUTPATIENT
Start: 2024-09-10 | End: 2024-09-11 | Stop reason: HOSPADM

## 2024-09-10 RX ORDER — BISACODYL 10 MG
10 SUPPOSITORY, RECTAL RECTAL DAILY PRN
Status: DISCONTINUED | OUTPATIENT
Start: 2024-09-13 | End: 2024-09-11 | Stop reason: HOSPADM

## 2024-09-10 RX ORDER — OXYCODONE HYDROCHLORIDE 5 MG/1
10 TABLET ORAL
Status: DISCONTINUED | OUTPATIENT
Start: 2024-09-10 | End: 2024-09-10

## 2024-09-10 RX ORDER — HYDROMORPHONE HCL IN WATER/PF 6 MG/30 ML
0.4 PATIENT CONTROLLED ANALGESIA SYRINGE INTRAVENOUS EVERY 5 MIN PRN
Status: DISCONTINUED | OUTPATIENT
Start: 2024-09-10 | End: 2024-09-10 | Stop reason: HOSPADM

## 2024-09-10 RX ORDER — PIPERACILLIN SODIUM, TAZOBACTAM SODIUM 3; .375 G/15ML; G/15ML
3.38 INJECTION, POWDER, LYOPHILIZED, FOR SOLUTION INTRAVENOUS EVERY 8 HOURS
Status: DISCONTINUED | OUTPATIENT
Start: 2024-09-10 | End: 2024-09-11 | Stop reason: HOSPADM

## 2024-09-10 RX ORDER — PROPOFOL 10 MG/ML
INJECTION, EMULSION INTRAVENOUS PRN
Status: DISCONTINUED | OUTPATIENT
Start: 2024-09-10 | End: 2024-09-10

## 2024-09-10 RX ORDER — HYDROMORPHONE HCL IN WATER/PF 6 MG/30 ML
0.2 PATIENT CONTROLLED ANALGESIA SYRINGE INTRAVENOUS
Status: DISCONTINUED | OUTPATIENT
Start: 2024-09-10 | End: 2024-09-11

## 2024-09-10 RX ORDER — ONDANSETRON 2 MG/ML
INJECTION INTRAMUSCULAR; INTRAVENOUS PRN
Status: DISCONTINUED | OUTPATIENT
Start: 2024-09-10 | End: 2024-09-10

## 2024-09-10 RX ORDER — KETAMINE HYDROCHLORIDE 10 MG/ML
INJECTION INTRAMUSCULAR; INTRAVENOUS PRN
Status: DISCONTINUED | OUTPATIENT
Start: 2024-09-10 | End: 2024-09-10

## 2024-09-10 RX ORDER — ACETAMINOPHEN 325 MG/1
650 TABLET ORAL EVERY 4 HOURS PRN
Status: DISCONTINUED | OUTPATIENT
Start: 2024-09-13 | End: 2024-09-11 | Stop reason: HOSPADM

## 2024-09-10 RX ORDER — FENTANYL CITRATE 50 UG/ML
25 INJECTION, SOLUTION INTRAMUSCULAR; INTRAVENOUS
Status: DISCONTINUED | OUTPATIENT
Start: 2024-09-10 | End: 2024-09-10

## 2024-09-10 RX ORDER — MEPERIDINE HYDROCHLORIDE 25 MG/ML
12.5 INJECTION INTRAMUSCULAR; INTRAVENOUS; SUBCUTANEOUS EVERY 5 MIN PRN
Status: DISCONTINUED | OUTPATIENT
Start: 2024-09-10 | End: 2024-09-10 | Stop reason: HOSPADM

## 2024-09-10 RX ORDER — NALOXONE HYDROCHLORIDE 0.4 MG/ML
0.1 INJECTION, SOLUTION INTRAMUSCULAR; INTRAVENOUS; SUBCUTANEOUS
Status: DISCONTINUED | OUTPATIENT
Start: 2024-09-10 | End: 2024-09-10 | Stop reason: HOSPADM

## 2024-09-10 RX ORDER — TRAMADOL HYDROCHLORIDE 50 MG/1
50 TABLET ORAL EVERY 6 HOURS PRN
Status: DISCONTINUED | OUTPATIENT
Start: 2024-09-10 | End: 2024-09-11 | Stop reason: HOSPADM

## 2024-09-10 RX ORDER — HYDROMORPHONE HYDROCHLORIDE 1 MG/ML
0.5 INJECTION, SOLUTION INTRAMUSCULAR; INTRAVENOUS; SUBCUTANEOUS
Status: COMPLETED | OUTPATIENT
Start: 2024-09-10 | End: 2024-09-10

## 2024-09-10 RX ORDER — DEXAMETHASONE SODIUM PHOSPHATE 10 MG/ML
INJECTION, SOLUTION INTRAMUSCULAR; INTRAVENOUS PRN
Status: DISCONTINUED | OUTPATIENT
Start: 2024-09-10 | End: 2024-09-10

## 2024-09-10 RX ORDER — ENOXAPARIN SODIUM 100 MG/ML
40 INJECTION SUBCUTANEOUS EVERY 12 HOURS
Status: DISCONTINUED | OUTPATIENT
Start: 2024-09-11 | End: 2024-09-11 | Stop reason: HOSPADM

## 2024-09-10 RX ADMIN — KETOROLAC TROMETHAMINE 15 MG: 15 INJECTION, SOLUTION INTRAMUSCULAR; INTRAVENOUS at 00:45

## 2024-09-10 RX ADMIN — LIDOCAINE HYDROCHLORIDE 10 ML: 10 INJECTION, SOLUTION INFILTRATION; PERINEURAL at 11:24

## 2024-09-10 RX ADMIN — ACETAMINOPHEN 975 MG: 325 TABLET ORAL at 11:04

## 2024-09-10 RX ADMIN — DEXAMETHASONE SODIUM PHOSPHATE 10 MG: 10 INJECTION, SOLUTION INTRAMUSCULAR; INTRAVENOUS at 11:33

## 2024-09-10 RX ADMIN — LABETALOL HYDROCHLORIDE 10 MG: 5 INJECTION, SOLUTION INTRAVENOUS at 13:13

## 2024-09-10 RX ADMIN — MIDAZOLAM 2 MG: 1 INJECTION INTRAMUSCULAR; INTRAVENOUS at 11:15

## 2024-09-10 RX ADMIN — DEXMEDETOMIDINE HYDROCHLORIDE 0.3 MCG/KG/HR: 400 INJECTION INTRAVENOUS at 11:31

## 2024-09-10 RX ADMIN — HYDROMORPHONE HYDROCHLORIDE 0.5 MG: 1 INJECTION, SOLUTION INTRAMUSCULAR; INTRAVENOUS; SUBCUTANEOUS at 05:35

## 2024-09-10 RX ADMIN — PIPERACILLIN AND TAZOBACTAM 3.38 G: 3; .375 INJECTION, POWDER, FOR SOLUTION INTRAVENOUS at 21:05

## 2024-09-10 RX ADMIN — FENTANYL CITRATE 50 MCG: 50 INJECTION INTRAMUSCULAR; INTRAVENOUS at 11:53

## 2024-09-10 RX ADMIN — SUGAMMADEX 300 MG: 100 INJECTION, SOLUTION INTRAVENOUS at 12:19

## 2024-09-10 RX ADMIN — FENTANYL CITRATE 50 MCG: 50 INJECTION, SOLUTION INTRAMUSCULAR; INTRAVENOUS at 13:13

## 2024-09-10 RX ADMIN — HYDROMORPHONE HYDROCHLORIDE 0.4 MG: 0.2 INJECTION, SOLUTION INTRAMUSCULAR; INTRAVENOUS; SUBCUTANEOUS at 21:06

## 2024-09-10 RX ADMIN — Medication 3 G: at 11:15

## 2024-09-10 RX ADMIN — ROCURONIUM BROMIDE 30 MG: 50 INJECTION, SOLUTION INTRAVENOUS at 11:39

## 2024-09-10 RX ADMIN — KETAMINE HYDROCHLORIDE 50 MG: 10 INJECTION INTRAMUSCULAR; INTRAVENOUS at 11:24

## 2024-09-10 RX ADMIN — PROPOFOL 300 MG: 10 INJECTION, EMULSION INTRAVENOUS at 11:24

## 2024-09-10 RX ADMIN — HYDROMORPHONE HYDROCHLORIDE 0.4 MG: 0.2 INJECTION, SOLUTION INTRAMUSCULAR; INTRAVENOUS; SUBCUTANEOUS at 23:31

## 2024-09-10 RX ADMIN — MOMETASONE FUROATE 2 PUFF: 100 AEROSOL RESPIRATORY (INHALATION) at 21:05

## 2024-09-10 RX ADMIN — HYDROMORPHONE HYDROCHLORIDE 0.5 MG: 1 INJECTION, SOLUTION INTRAMUSCULAR; INTRAVENOUS; SUBCUTANEOUS at 04:22

## 2024-09-10 RX ADMIN — ONDANSETRON 8 MG: 2 INJECTION INTRAMUSCULAR; INTRAVENOUS at 11:33

## 2024-09-10 RX ADMIN — PIPERACILLIN AND TAZOBACTAM 3.38 G: 3; .375 INJECTION, POWDER, FOR SOLUTION INTRAVENOUS at 16:16

## 2024-09-10 RX ADMIN — SODIUM CHLORIDE, POTASSIUM CHLORIDE, SODIUM LACTATE AND CALCIUM CHLORIDE: 600; 310; 30; 20 INJECTION, SOLUTION INTRAVENOUS at 11:15

## 2024-09-10 RX ADMIN — TRAMADOL HYDROCHLORIDE 50 MG: 50 TABLET, COATED ORAL at 17:36

## 2024-09-10 RX ADMIN — HYDROMORPHONE HYDROCHLORIDE 0.5 MG: 1 INJECTION, SOLUTION INTRAMUSCULAR; INTRAVENOUS; SUBCUTANEOUS at 07:27

## 2024-09-10 RX ADMIN — FENTANYL CITRATE 100 MCG: 50 INJECTION INTRAMUSCULAR; INTRAVENOUS at 11:15

## 2024-09-10 RX ADMIN — SODIUM CHLORIDE, POTASSIUM CHLORIDE, SODIUM LACTATE AND CALCIUM CHLORIDE: 600; 310; 30; 20 INJECTION, SOLUTION INTRAVENOUS at 11:06

## 2024-09-10 RX ADMIN — SENNOSIDES AND DOCUSATE SODIUM 1 TABLET: 8.6; 5 TABLET ORAL at 21:05

## 2024-09-10 RX ADMIN — ACETAMINOPHEN 975 MG: 325 TABLET ORAL at 17:36

## 2024-09-10 RX ADMIN — ROCURONIUM BROMIDE 70 MG: 50 INJECTION, SOLUTION INTRAVENOUS at 11:25

## 2024-09-10 ASSESSMENT — ACTIVITIES OF DAILY LIVING (ADL)
ADLS_ACUITY_SCORE: 20
ADLS_ACUITY_SCORE: 35
ADLS_ACUITY_SCORE: 20
ADLS_ACUITY_SCORE: 20
ADLS_ACUITY_SCORE: 35
ADLS_ACUITY_SCORE: 20
ADLS_ACUITY_SCORE: 35
ADLS_ACUITY_SCORE: 35
ADLS_ACUITY_SCORE: 20
ADLS_ACUITY_SCORE: 35

## 2024-09-10 NOTE — PHARMACY-ADMISSION MEDICATION HISTORY
Pharmacist Admission Medication History    Admission medication history is complete. The information provided in this note is only as accurate as the sources available at the time of the update.    Information Source(s): Patient and CareEverywhere/SureScripts via in-person    Pertinent Information: patient states only prescription medication is Asmanex inhaler.     Changes made to PTA medication list:  Added: ibuprofen   Deleted: albuterol HFA (duplicate)  Changed: None    Allergies reviewed with patient and updates made in EHR: yes    Medication History Completed By: CLARISSA FERGUSON [facility] 9/10/2024 3:12 PM    PTA Med List   Medication Sig Last Dose    albuterol (PROAIR HFA/PROVENTIL HFA/VENTOLIN HFA) 108 (90 Base) MCG/ACT inhaler INHALE 2 PUFFS INTO THE LUNGS EVERY 6 HOURS AS NEEDED FOR SHORTNESS OF BREATH, WHEEZING OR COUGH More than a month at PRN    EPINEPHrine (ANY BX GENERIC EQUIV) 0.3 MG/0.3ML injection 2-pack Inject 0.3 mLs (0.3 mg) into the muscle as needed for anaphylaxis May repeat one time in 5-15 minutes if response to initial dose is inadequate.  at PRN    ibuprofen (ADVIL/MOTRIN) 200 MG tablet Take 800 mg by mouth every 8 hours as needed for pain. 9/9/2024 at PM    mometasone furoate (ASMANEX HFA) 100 MCG/ACT inhaler Inhale 2 puffs into the lungs 2 times daily 9/9/2024 at AM

## 2024-09-10 NOTE — ANESTHESIA PROCEDURE NOTES
Airway       Patient location during procedure: OR       Procedure Start/Stop Times: 9/10/2024 11:26 AM  Staff -        Anesthesiologist:  Cam Rivera MD       CRNA: Paula Akbar APRN CRNA       Performed By: CRNAIndications and Patient Condition       Indications for airway management: michael-procedural       Induction type:intravenous       Mask difficulty assessment: 2 - vent by mask + OA or adjuvant +/- NMBA    Final Airway Details       Final airway type: endotracheal airway       Successful airway: ETT - single  Endotracheal Airway Details        ETT size (mm): 8.0       Cuffed: yes       Cuff volume (mL): 10       Successful intubation technique: video laryngoscopy       VL Blade Size: Glidescope 4       Grade View of Cords: 1       Adjucts: stylet       Position: Right       Measured from: lips       Secured at (cm): 23       Bite block used: None    Post intubation assessment        Placement verified by: capnometry, equal breath sounds and chest rise        Number of attempts at approach: 1       Number of other approaches attempted: 0       Secured with: tape       Ease of procedure: easy       Dentition: Intact and Unchanged    Medication(s) Administered   Medication Administration Time: 9/10/2024 11:26 AM

## 2024-09-10 NOTE — ANESTHESIA CARE TRANSFER NOTE
Patient: Sunil Butts    Procedure: Procedure(s):  CHOLECYSTECTOMY, LAPAROSCOPIC       Diagnosis: Symptomatic cholelithiasis [K80.20]  Diagnosis Additional Information: No value filed.    Anesthesia Type:   General     Note:    Oropharynx: oropharynx clear of all foreign objects and spontaneously breathing  Level of Consciousness: drowsy  Oxygen Supplementation: face mask  Level of Supplemental Oxygen (L/min / FiO2): 6  Independent Airway: airway patency satisfactory and stable  Dentition: dentition unchanged  Vital Signs Stable: post-procedure vital signs reviewed and stable  Report to RN Given: handoff report given  Patient transferred to: PACU    Handoff Report: Identifed the Patient, Identified the Reponsible Provider, Reviewed the pertinent medical history, Discussed the surgical course, Reviewed Intra-OP anesthesia mangement and issues during anesthesia, Set expectations for post-procedure period and Allowed opportunity for questions and acknowledgement of understanding      Vitals:  Vitals Value Taken Time   /85 1238   Temp 37.4  C (99.32  F) 09/10/24 1238   Pulse 108 09/10/24 1238   Resp 23 09/10/24 1238   SpO2 95 % 09/10/24 1238   Vitals shown include unfiled device data.    Electronically Signed By: MITA Ball CRNA  September 10, 2024  12:40 PM

## 2024-09-10 NOTE — ED PROVIDER NOTES
EMERGENCY DEPARTMENT ENCOUNTER      NAME: Sunil Butts  AGE: 39 year old male  YOB: 1985  MRN: [id number]  EVALUATION DATE & TIME: 9/9/2024 11:54 PM    PCP: Anita Reynoso    ED PROVIDER: Pee Quan MD      Chief Complaint   Patient presents with    Abdominal Pain         FINAL IMPRESSION:  1. Symptomatic cholelithiasis    2. Diverticulitis of colon          ED COURSE & MEDICAL DECISION MAKING:    Pertinent Labs & Imaging studies reviewed. (See chart for details)  39 year old male presents to the Emergency Department for evaluation of abd pain    ED Course as of 09/10/24 0606   Tue Sep 10, 2024   0121 Patient is a 39-year-old male who presents to the emergency department with right upper quadrant abdominal pain.  He had an ultrasound done today that showed cholelithiasis without evidence of cholecystitis.  After the ultrasound he went home and had dinner, and since then he has had more severe pain than he has typically had with his attacks of right upper quadrant pain in the past several weeks, it has been constant.  On exam he has right upper quadrant tenderness with positive Servin sign, which was negative on his ultrasound today.  Concern for now obstructing stone or persistent symptomatic cholelithiasis.  He has no transaminitis or pancreatitis.  No electrolyte abnormalities or kidney injury.  He has a mild leukocytosis of 12.4, but no anemia.  He is not jaundiced and does not have a fever.  His urinalysis shows microscopic hematuria, but no evidence of infection.  Will obtain right upper quadrant ultrasound and try to control his symptoms.   0415 Ultrasound again shows cholelithiasis without evidence of cholecystitis.  Will reassess the patient now.   0418 Patient symptoms have persisted.  Will provide Dilaudid now, and obtain noncontrast CT scan of the abdomen in the event that it is not symptomatic cholelithiasis causing his symptoms, but instead may be due to a  kidney stone, as he did have microscopic hematuria on urinalysis.   0510 CT scan shows evidence of diverticulitis at the hepatic flexure, which is likely the cause of the patient's symptoms in the absence of any transaminitis on lab work or cholecystitis seen on ultrasound.   0524 Reassessed patient, who is still having 7 out of 10 pain despite Dilaudid.  Given that his pain is persisting, and he is having this intermittent nature, which is less consistent with diverticulitis, general surgery was consulted.  I discussed with Dr. Smith, who agreed that the intermittent nature seems most consistent with symptomatic cholelithiasis, and he would be discussing with the day team when they arrive in 30 minutes.  Since he is otherwise healthy, he recommends that he stays as an ER patient in the meantime as he may not require full admission to the hospital under the hospitalist service as he would likely go home after his cholecystectomy today.       Medical Decision Making  Obtained supplemental history:Supplemental history obtained?: No  Reviewed external records: External records reviewed?: No  Care impacted by chronic illness:N/A  Care significantly affected by social determinants of health:N/A  Did you consider but not order tests?: Work up considered but not performed and documented in chart, if applicable  Did you interpret images independently?: Independent interpretation of ECG and images noted in documentation, when applicable.  Consultation discussion with other provider:Did you involve another provider (consultant, , pharmacy, etc.)?: I discussed the care with another health care provider, see documentation for details.  Admission considered. Patient was signed out to the oncoming physician, disposition pending.  Not Applicable      At the conclusion of the encounter I discussed the results of all of the tests and the disposition. The questions were answered. The patient or family acknowledged understanding and  "was agreeable with the care plan.     0 minutes of critical care time     MEDICATIONS GIVEN IN THE EMERGENCY:  Medications   HYDROmorphone (PF) (DILAUDID) injection 0.5 mg (0.5 mg Intravenous $Given 9/10/24 0535)   ketorolac (TORADOL) injection 15 mg (15 mg Intravenous $Given 9/10/24 0045)       NEW PRESCRIPTIONS STARTED AT TODAY'S ER VISIT  New Prescriptions    No medications on file          =================================================================    HPI    Patient information was obtained from: patient, chart    Use of : N/A        Sunil Butts is a 39 year old male with a pertinent history of asthma who presents to this ED for evaluation of right upper quadrant abdominal pain.  The patient has been having \"attacks\" for several weeks after eating food where he has pain in his right upper quadrant that are quite severe, but resolve spontaneously.  He had an ultrasound done today to evaluate this, which showed cholelithiasis without evidence of cholecystitis.  He had not yet followed up the results of this with his primary care provider.  After the ultrasound was done he had dinner, and his symptoms returned, and he feels it is much worse, and has not gone away.  His symptoms are constant in his right upper quadrant and radiate to his back.  No dysuria, hematuria.  No skin changes including jaundice or rash.  No fevers.  No nausea, vomiting.  No prior abdominal surgeries.      PAST MEDICAL HISTORY:  No past medical history on file.    PAST SURGICAL HISTORY:  No past surgical history on file.        CURRENT MEDICATIONS:    albuterol (PROAIR HFA/PROVENTIL HFA/VENTOLIN HFA) 108 (90 Base) MCG/ACT inhaler  albuterol (PROAIR HFA/PROVENTIL HFA/VENTOLIN HFA) 108 (90 Base) MCG/ACT inhaler  EPINEPHrine (ANY BX GENERIC EQUIV) 0.3 MG/0.3ML injection 2-pack  mometasone furoate (ASMANEX HFA) 100 MCG/ACT inhaler        ALLERGIES:  Allergies   Allergen Reactions    Bee Venom Unknown and Anaphylaxis    " Seasonal Allergies Unknown     Runny nose and eyes   Also dogs       FAMILY HISTORY:  No family history on file.    SOCIAL HISTORY:   Social History     Socioeconomic History    Marital status:    Tobacco Use    Smoking status: Never    Smokeless tobacco: Never     Social Determinants of Health     Financial Resource Strain: Low Risk  (12/29/2023)    Financial Resource Strain     Within the past 12 months, have you or your family members you live with been unable to get utilities (heat, electricity) when it was really needed?: No   Food Insecurity: Low Risk  (12/29/2023)    Food Insecurity     Within the past 12 months, did you worry that your food would run out before you got money to buy more?: No     Within the past 12 months, did the food you bought just not last and you didn t have money to get more?: No   Transportation Needs: Low Risk  (12/29/2023)    Transportation Needs     Within the past 12 months, has lack of transportation kept you from medical appointments, getting your medicines, non-medical meetings or appointments, work, or from getting things that you need?: No   Interpersonal Safety: Low Risk  (9/6/2024)    Interpersonal Safety     Do you feel physically and emotionally safe where you currently live?: Yes     Within the past 12 months, have you been hit, slapped, kicked or otherwise physically hurt by someone?: No     Within the past 12 months, have you been humiliated or emotionally abused in other ways by your partner or ex-partner?: No   Housing Stability: Low Risk  (12/29/2023)    Housing Stability     Do you have housing? : Yes     Are you worried about losing your housing?: No       VITALS:  /78   Pulse 92   Temp 98.1  F (36.7  C) (Oral)   Resp 16   Wt (!) 152.4 kg (336 lb)   SpO2 97%   BMI 39.84 kg/m      PHYSICAL EXAM    Physical Exam  Vitals and nursing note reviewed.   Constitutional:       General: He is not in acute distress.     Appearance: Normal appearance. He is  normal weight. He is not ill-appearing.   HENT:      Head: Normocephalic and atraumatic.      Nose: Nose normal.      Mouth/Throat:      Mouth: Mucous membranes are moist.      Pharynx: Oropharynx is clear.   Eyes:      Extraocular Movements: Extraocular movements intact.      Conjunctiva/sclera: Conjunctivae normal.      Pupils: Pupils are equal, round, and reactive to light.   Cardiovascular:      Rate and Rhythm: Normal rate and regular rhythm.      Pulses: Normal pulses.      Heart sounds: Normal heart sounds. No murmur heard.  Pulmonary:      Effort: Pulmonary effort is normal. No respiratory distress.      Breath sounds: Normal breath sounds.   Abdominal:      General: Abdomen is flat. There is no distension.      Palpations: Abdomen is soft.      Tenderness: There is abdominal tenderness (RUQ, positive Servin's). There is no right CVA tenderness, left CVA tenderness, guarding or rebound.   Musculoskeletal:         General: Normal range of motion.      Cervical back: Normal range of motion.      Right lower leg: No edema.      Left lower leg: No edema.   Skin:     General: Skin is warm and dry.      Capillary Refill: Capillary refill takes less than 2 seconds.      Coloration: Skin is not jaundiced or pale.      Findings: No rash.   Neurological:      General: No focal deficit present.      Mental Status: He is alert and oriented to person, place, and time. Mental status is at baseline.   Psychiatric:         Mood and Affect: Mood normal.         Behavior: Behavior normal.         Thought Content: Thought content normal.         Judgment: Judgment normal.            LAB:  All pertinent labs reviewed and interpreted.  Results for orders placed or performed during the hospital encounter of 09/09/24   US Abdomen Limited (RUQ)    Impression    IMPRESSION:  1.  Cholelithiasis without sonographic evidence of acute cholecystitis.  2.  Hepatic steatosis.  3.  Pancreas not visualized secondary to overlying bowel gas.      CT Abdomen Pelvis w/o Contrast    Impression    IMPRESSION:   1. Mild ill-defined haziness is present in the fat abutting the lateral aspect of the hepatic flexure of colon. This is nonspecific, but suggestive of an infectious or inflammatory process. There appears to be a tiny diverticulum from the colon in this   region and therefore these findings could relate to underlying diverticulitis. If the patient's symptoms persist or worsen, a follow-up CT scan would be useful for reevaluation.  2. A very small amount of nonspecific free fluid in the pelvis and a trace amount of perihepatic free fluid.  3. No other cause of acute pain identified in the abdomen or pelvis. No renal or ureteral calculi or evidence of urinary obstruction and no convincing evidence of acute appendicitis.              Basic metabolic panel   Result Value Ref Range    Sodium 140 135 - 145 mmol/L    Potassium 3.8 3.4 - 5.3 mmol/L    Chloride 103 98 - 107 mmol/L    Carbon Dioxide (CO2) 23 22 - 29 mmol/L    Anion Gap 14 7 - 15 mmol/L    Urea Nitrogen 13.1 6.0 - 20.0 mg/dL    Creatinine 0.86 0.67 - 1.17 mg/dL    GFR Estimate >90 >60 mL/min/1.73m2    Calcium 8.9 8.8 - 10.4 mg/dL    Glucose 95 70 - 99 mg/dL   Hepatic function panel   Result Value Ref Range    Protein Total 7.6 6.4 - 8.3 g/dL    Albumin 4.5 3.5 - 5.2 g/dL    Bilirubin Total 0.9 <=1.2 mg/dL    Alkaline Phosphatase 76 40 - 150 U/L    AST 26 0 - 45 U/L    ALT 28 0 - 70 U/L    Bilirubin Direct 0.28 0.00 - 0.30 mg/dL   Result Value Ref Range    Lipase 18 13 - 60 U/L   CBC with platelets and differential   Result Value Ref Range    WBC Count 12.4 (H) 4.0 - 11.0 10e3/uL    RBC Count 5.17 4.40 - 5.90 10e6/uL    Hemoglobin 15.5 13.3 - 17.7 g/dL    Hematocrit 44.4 40.0 - 53.0 %    MCV 86 78 - 100 fL    MCH 30.0 26.5 - 33.0 pg    MCHC 34.9 31.5 - 36.5 g/dL    RDW 12.4 10.0 - 15.0 %    Platelet Count 205 150 - 450 10e3/uL    % Neutrophils 76 %    % Lymphocytes 13 %    % Monocytes 9 %    %  Eosinophils 1 %    % Basophils 0 %    % Immature Granulocytes 0 %    NRBCs per 100 WBC 0 <1 /100    Absolute Neutrophils 9.4 (H) 1.6 - 8.3 10e3/uL    Absolute Lymphocytes 1.6 0.8 - 5.3 10e3/uL    Absolute Monocytes 1.2 0.0 - 1.3 10e3/uL    Absolute Eosinophils 0.1 0.0 - 0.7 10e3/uL    Absolute Basophils 0.1 0.0 - 0.2 10e3/uL    Absolute Immature Granulocytes 0.0 <=0.4 10e3/uL    Absolute NRBCs 0.0 10e3/uL   UA with Microscopic reflex to Culture    Specimen: Urine, Clean Catch   Result Value Ref Range    Color Urine Yellow Colorless, Straw, Light Yellow, Yellow    Appearance Urine Clear Clear    Glucose Urine Negative Negative mg/dL    Bilirubin Urine Negative Negative    Ketones Urine 150 (A) Negative mg/dL    Specific Gravity Urine 1.030 1.001 - 1.030    Blood Urine 0.03 mg/dL (A) Negative    pH Urine 5.5 5.0 - 7.0    Protein Albumin Urine 30 (A) Negative mg/dL    Urobilinogen Urine <2.0 <2.0 mg/dL    Nitrite Urine Negative Negative    Leukocyte Esterase Urine Negative Negative    Mucus Urine Present (A) None Seen /LPF    Amorphous Crystals Urine Few (A) None Seen /HPF    RBC Urine 2 <=2 /HPF    WBC Urine 1 <=5 /HPF       RADIOLOGY:  Reviewed all pertinent imaging. Please see official radiology report.  CT Abdomen Pelvis w/o Contrast   Final Result   IMPRESSION:    1. Mild ill-defined haziness is present in the fat abutting the lateral aspect of the hepatic flexure of colon. This is nonspecific, but suggestive of an infectious or inflammatory process. There appears to be a tiny diverticulum from the colon in this    region and therefore these findings could relate to underlying diverticulitis. If the patient's symptoms persist or worsen, a follow-up CT scan would be useful for reevaluation.   2. A very small amount of nonspecific free fluid in the pelvis and a trace amount of perihepatic free fluid.   3. No other cause of acute pain identified in the abdomen or pelvis. No renal or ureteral calculi or evidence of  urinary obstruction and no convincing evidence of acute appendicitis.                     US Abdomen Limited (RUQ)   Final Result   IMPRESSION:   1.  Cholelithiasis without sonographic evidence of acute cholecystitis.   2.  Hepatic steatosis.   3.  Pancreas not visualized secondary to overlying bowel gas.             PROCEDURES:   None      [facility] Documentation:   CMS Diagnoses:                 Pee Quan MD  [facility] EMERGENCY DEPARTMENT  [address] [place] [id number] [phone]       Pee Quan MD  09/10/24 0606

## 2024-09-10 NOTE — ED PROVIDER NOTES
EMERGENCY DEPARTMENT SIGNOUT NOTE    Patient signed out to me from Dr. Riley Mon.      Sunil Butts is a 39 year old male        RADIOLOGY/LABS:  Reviewed all pertinent imaging. Please see official radiology report. All pertinent labs reviewed and interpreted.    Results for orders placed or performed during the hospital encounter of 09/09/24   US Abdomen Limited (RUQ)    Impression    IMPRESSION:  1.  Cholelithiasis without sonographic evidence of acute cholecystitis.  2.  Hepatic steatosis.  3.  Pancreas not visualized secondary to overlying bowel gas.     CT Abdomen Pelvis w/o Contrast    Impression    IMPRESSION:   1. Mild ill-defined haziness is present in the fat abutting the lateral aspect of the hepatic flexure of colon. This is nonspecific, but suggestive of an infectious or inflammatory process. There appears to be a tiny diverticulum from the colon in this   region and therefore these findings could relate to underlying diverticulitis. If the patient's symptoms persist or worsen, a follow-up CT scan would be useful for reevaluation.  2. A very small amount of nonspecific free fluid in the pelvis and a trace amount of perihepatic free fluid.  3. No other cause of acute pain identified in the abdomen or pelvis. No renal or ureteral calculi or evidence of urinary obstruction and no convincing evidence of acute appendicitis.              Basic metabolic panel   Result Value Ref Range    Sodium 140 135 - 145 mmol/L    Potassium 3.8 3.4 - 5.3 mmol/L    Chloride 103 98 - 107 mmol/L    Carbon Dioxide (CO2) 23 22 - 29 mmol/L    Anion Gap 14 7 - 15 mmol/L    Urea Nitrogen 13.1 6.0 - 20.0 mg/dL    Creatinine 0.86 0.67 - 1.17 mg/dL    GFR Estimate >90 >60 mL/min/1.73m2    Calcium 8.9 8.8 - 10.4 mg/dL    Glucose 95 70 - 99 mg/dL   Hepatic function panel   Result Value Ref Range    Protein Total 7.6 6.4 - 8.3 g/dL    Albumin 4.5 3.5 - 5.2 g/dL    Bilirubin Total 0.9 <=1.2 mg/dL    Alkaline Phosphatase 76 40 -  150 U/L    AST 26 0 - 45 U/L    ALT 28 0 - 70 U/L    Bilirubin Direct 0.28 0.00 - 0.30 mg/dL   Result Value Ref Range    Lipase 18 13 - 60 U/L   CBC with platelets and differential   Result Value Ref Range    WBC Count 12.4 (H) 4.0 - 11.0 10e3/uL    RBC Count 5.17 4.40 - 5.90 10e6/uL    Hemoglobin 15.5 13.3 - 17.7 g/dL    Hematocrit 44.4 40.0 - 53.0 %    MCV 86 78 - 100 fL    MCH 30.0 26.5 - 33.0 pg    MCHC 34.9 31.5 - 36.5 g/dL    RDW 12.4 10.0 - 15.0 %    Platelet Count 205 150 - 450 10e3/uL    % Neutrophils 76 %    % Lymphocytes 13 %    % Monocytes 9 %    % Eosinophils 1 %    % Basophils 0 %    % Immature Granulocytes 0 %    NRBCs per 100 WBC 0 <1 /100    Absolute Neutrophils 9.4 (H) 1.6 - 8.3 10e3/uL    Absolute Lymphocytes 1.6 0.8 - 5.3 10e3/uL    Absolute Monocytes 1.2 0.0 - 1.3 10e3/uL    Absolute Eosinophils 0.1 0.0 - 0.7 10e3/uL    Absolute Basophils 0.1 0.0 - 0.2 10e3/uL    Absolute Immature Granulocytes 0.0 <=0.4 10e3/uL    Absolute NRBCs 0.0 10e3/uL   UA with Microscopic reflex to Culture    Specimen: Urine, Clean Catch   Result Value Ref Range    Color Urine Yellow Colorless, Straw, Light Yellow, Yellow    Appearance Urine Clear Clear    Glucose Urine Negative Negative mg/dL    Bilirubin Urine Negative Negative    Ketones Urine 150 (A) Negative mg/dL    Specific Gravity Urine 1.030 1.001 - 1.030    Blood Urine 0.03 mg/dL (A) Negative    pH Urine 5.5 5.0 - 7.0    Protein Albumin Urine 30 (A) Negative mg/dL    Urobilinogen Urine <2.0 <2.0 mg/dL    Nitrite Urine Negative Negative    Leukocyte Esterase Urine Negative Negative    Mucus Urine Present (A) None Seen /LPF    Amorphous Crystals Urine Few (A) None Seen /HPF    RBC Urine 2 <=2 /HPF    WBC Urine 1 <=5 /HPF           ED COURSE :  Pertinent Labs & Imaging studies reviewed. (See chart for details)  39 year old male with history of asthma who presents to the Emergency Department for evaluation of RUQ abdominal pain.       ED Course as of 09/10/24 1009    Tue Sep 10, 2024   0121 Patient is a 39-year-old male who presents to the emergency department with right upper quadrant abdominal pain.  He had an ultrasound done today that showed cholelithiasis without evidence of cholecystitis.  After the ultrasound he went home and had dinner, and since then he has had more severe pain than he has typically had with his attacks of right upper quadrant pain in the past several weeks, it has been constant.  On exam he has right upper quadrant tenderness with positive Servin sign, which was negative on his ultrasound today.  Concern for now obstructing stone or persistent symptomatic cholelithiasis.  He has no transaminitis or pancreatitis.  No electrolyte abnormalities or kidney injury.  He has a mild leukocytosis of 12.4, but no anemia.  He is not jaundiced and does not have a fever.  His urinalysis shows microscopic hematuria, but no evidence of infection.  Will obtain right upper quadrant ultrasound and try to control his symptoms.   0415 Ultrasound again shows cholelithiasis without evidence of cholecystitis.  Will reassess the patient now.   0418 Patient symptoms have persisted.  Will provide Dilaudid now, and obtain noncontrast CT scan of the abdomen in the event that it is not symptomatic cholelithiasis causing his symptoms, but instead may be due to a kidney stone, as he did have microscopic hematuria on urinalysis.   0510 CT scan shows evidence of diverticulitis at the hepatic flexure, which is likely the cause of the patient's symptoms in the absence of any transaminitis on lab work or cholecystitis seen on ultrasound.   0524 Reassessed patient, who is still having 7 out of 10 pain despite Dilaudid.  Given that his pain is persisting, and he is having this intermittent nature, which is less consistent with diverticulitis, general surgery was consulted.  I discussed with Dr. Smith, who agreed that the intermittent nature seems most consistent with symptomatic  cholelithiasis, and he would be discussing with the day team when they arrive in 30 minutes.  Since he is otherwise healthy, he recommends that he stays as an ER patient in the meantime as he may not require full admission to the hospital under the hospitalist service as he would likely go home after his cholecystectomy today.   1001 Dr. Interiano to take pt for ligia parker       FINAL IMPRESSION:  1. Symptomatic cholelithiasis    2. Diverticulitis of colon          The creation of this record is based on the scribe s observations of the work being performed by Ave Longo MD and the provider s statements to them. It was created on his behalf by Jessica Cervantes, a trained medical scribe. This document has been checked and approved by the attending provider.    Ave Longo MD  Emergency Medicine  [facility] EMERGENCY DEPARTMENT  [address] [place] [id number]  [phone]  Dept: [phone]     Ave Longo MD  09/10/24 3021

## 2024-09-10 NOTE — OP NOTE
Name:  Sunil NANNETTE Beckie  PCP:  Anita Reynoso  Procedure Date:  9/9/2024 - 9/10/2024      Procedure(s):  CHOLECYSTECTOMY, LAPAROSCOPIC    Pre-Procedure Diagnosis:  Symptomatic cholelithiasis [K80.20]     Post-Procedure Diagnosis:    Perforated cholecystitis    Surgeon(s):  Davina Dutta PA-C Borut, Jeffrey James, DO    Circulator: Mica Joseph RN  Relief Circulator: Emma Young RN  Relief Scrub: Jeaneth Gloria  Scrub Person: Marci Toledo; Adam Morales    Anesthesia Type:  GET      Findings:  Perforated gallbladder with pinpoint perforation on the mesenteric side of the gallbladder with bile peritonitis in the right upper quadrant    Operative Report:    The patient was taken to the operating room and placed in a supine position.  Endotracheal intubation was performed.  SCDs were placed on bilateral lower extremities.  Antibiotics were given prior to skin incision.  The abdomen was prepped and draped in a sterile fashion.  Our PA Chino Dutta was present throughout the entire case and instrumental in closure and driving the camera.    I began the first portion of the procedure by injecting quarter percent Marcaine with epinephrine into the supraumbilical position.  I then made a 5 mm transverse incision above the umbilicus and established a pneumoperitoneum using a Veress needle technique.  Once the pneumoperitoneum was established,I placed a 5 mm trocar with a 5 mm 30  camera into the abdomen.  The surrounding structures were scrutinized for any injuries upon entry.  I did not find any. I placed an 11 mm trocar in the subxiphoid position as well as 2 right upper quadrant 5 mm trochars under direct visualization.  All trochars were placed after local anesthetic was injected to the fascial layers.    Immediately upon entry we noted bile in the right upper quadrant with fibrinous exudate surrounding the entire liver and anterior right upper quadrant abdominal wall.  There was  bile-stained liver as well.  I retracted the gallbladder cephalad and immediately noted an area of perforation.  There was bile coming from the pinpoint perforation with no other abnormalities including masses or anything else that was suspicious.  I then had my assistant retract the gallbladder cephalad and I persisted to dissect out the cystic duct and cystic artery in the standard fashion using blunt dissection and Bovie electrocautery.  Once I obtained a critical view I then placed several 10 mm titanium clips on the cystic duct 3 distally and one at the gallbladder and infundibulum junction.  I then placed 2 clips across the cystic artery.  Next I transected both the cystic duct and cystic artery with sharp dissection and removed the gallbladder off the gallbladder fossa using Bovie electrocautery.  I then placed the gallbladder into an Endo Catch bag and brought it out through the subxiphoid port site incision.  Next I achieved hemostasis using Bovie electrocautery and scrutinized the surgical area for any ongoing bleeding.  None was found.  I irrigated the abdomen copiously and then placed a 15 Hussain drain to the right upper quadrant trocar.  This was sutured to the skin with a drain stitch.  I then closed the 11 mm subxiphoid port site incision using a Luisa needle and an 0 Vicryl suture.  Once this was complete I removed all trochars and desufflated the abdomen.  I then injected local anesthetic and all fascial layers and reapproximated the skin edges of all 4 trocar sites with 4-0 Monocryl subcuticular sutures.  The wounds were then cleaned and covered with dry sterile dressings.  All sponge counts and needle counts were correct at the end of the procedure.    Estimated Blood Loss:   5Cc    Specimens:    ID Type Source Tests Collected by Time Destination   1 : GALLBLADDER Tissue Gallbladder SURGICAL PATHOLOGY EXAM Jay Interiano DO 9/10/2024 12:13 PM           Drains:   Closed/Suction Drain 1 Right  Abdomen Bulb 19 Thai (Active)       Complications:    None    Jay Interiano, DO

## 2024-09-10 NOTE — ED TRIAGE NOTES
The patient reports abdominal pain on and off for the past week. He had an ultrasound today and was found to have gallstones. He reports that his pain came back at 1400 and has not gone away. This is abnormal for him.

## 2024-09-10 NOTE — H&P
General Surgery H&P  Sunil Butts MRN# [id number]   Age/Sex: 39 year old male YOB: 1985     Reason for consult: 1. Symptomatic cholelithiasis    2. Diverticulitis of colon            Requesting physician: Emergency Department                   Assessment and Plan:   Assessment:  Biliary Colic  Mr. Btuts is a 38 yo otherwise healthy M who was admitted with RUQ abdominal pain for several days. Afebrile and hemodynamically stable. Labs with mild leukocytosis, normal LFT's, normal lipase. RUQ US obtained and showed cholelithiasis and sludge. CT abdomen/pelvis also obtained and demonstrated mild haziness in the fat abutting the lateral aspect of the hepatic flexure of the colon, possible tiny diverticulum, incidental appendicolith without evidence of appendicitis and otherwise unremarkable. Discussed gallbladder pathophysiology with the patient including conservative versus operative management. Patient is amendable to proceed with surgery.    Plan:  - NPO for OR  - IV antibiotics  - OR for laparoscopic cholecystectomy. Tentative plan for discharge post-operatively pending intra-operative findings.         Physician Attestation     I saw and evaluated Sunil Butts as part of a shared APRN/PA visit.     I personally reviewed the vital signs, medications, labs, and imaging.    I personally provided a substantive portion of care for this patient and I approve the care plan as written by the HELEN.  I was involved with Medical Decision Making including:   Several day history of right upper quadrant pain.  Has had multiple episodes of right upper quadrant discomfort over the past several years.  Dark-colored urine or acholic stool.  Fairly uncomfortable at the bedside.  Abdomen-soft, positive Servin sign, tender to palpation right upper quadrant  CT and ultrasound images reviewed  Assessment/plan-right upper quadrant pain-likely biliary colic in nature.  CT scan reviewed and there is a question  whether or not there is a hepatic flexure diverticulum.  We will certainly evaluate this during surgery.  Risk and benefits of surgery were explained in detail the patient is consented to proceed.    Jay Interiano DO  Date of Service (when I saw the patient): 09/10/24            Chief Complaint:     Chief Complaint   Patient presents with    Abdominal Pain        History is obtained from the patient, electronic health record, and patient's spouse    HPI:   Sunil Butts is a 39 year old male who presented to the [facility] emergency department with right upper quadrant abdominal pain for several days.  Patient reports that he has had intermittent right upper quadrant abdominal pain over the last several years.  His spouse reports his first episode was in 2021 when they were attending a wedding.  He has had intermittent episodes since then, probably a total of 7, with his last 3 episodes being most severe.  He has had 3 episodes over the last few days all following eating.  Typically these episodes last for 1-1/2 to 2 hours and then resolve.  His last episode started day prior to admission around 2 PM and the pain never went away.  He tried Advil with no relief.  He reports seeing his primary care provider last Friday and having a follow-up ultrasound on Monday.  Currently, he continues to endorse right upper quadrant abdominal discomfort.  Denies fever, chills, nausea, vomiting, diarrhea, constipation, acholic stools, urinary changes, hematochezia, melena.  Denies any past abdominal surgical history.  Denies any tobacco use history.  Endorses alcohol use, typically 2 drinks on weekends.  Denies any other substance use.  Has not had anything to eat or drink since yesterday afternoon.          Past Medical History:   History reviewed. No pertinent past medical history.           Past Surgical History:   History reviewed. No pertinent surgical history.          Social History:    reports that he has never  smoked. He has never used smokeless tobacco.           Family History:   History reviewed. No pertinent family history.           Allergies:     Allergies   Allergen Reactions    Bee Venom Unknown and Anaphylaxis    Seasonal Allergies Unknown     Runny nose and eyes   Also dogs              Medications:     Prior to Admission medications    Medication Sig Start Date End Date Taking? Authorizing Provider   albuterol (PROAIR HFA/PROVENTIL HFA/VENTOLIN HFA) 108 (90 Base) MCG/ACT inhaler INHALE 2 PUFFS INTO THE LUNGS EVERY 6 HOURS AS NEEDED FOR SHORTNESS OF BREATH, WHEEZING OR COUGH 1/23/24   Anita Reynoso MD   albuterol (PROAIR HFA/PROVENTIL HFA/VENTOLIN HFA) 108 (90 Base) MCG/ACT inhaler Inhale 2 puffs into the lungs 12/2/23   Reported, Patient   EPINEPHrine (ANY BX GENERIC EQUIV) 0.3 MG/0.3ML injection 2-pack Inject 0.3 mLs (0.3 mg) into the muscle as needed for anaphylaxis May repeat one time in 5-15 minutes if response to initial dose is inadequate. 2/9/24   Libra Benavides MD   mometasone furoate (ASMANEX HFA) 100 MCG/ACT inhaler Inhale 2 puffs into the lungs 2 times daily 1/12/24   Libra Benavides MD              Review of Systems:   The Review of Systems is negative other than noted in the HPI            Physical Exam:   Patient Vitals for the past 24 hrs:   BP Temp Temp src Pulse Resp SpO2 Weight   09/10/24 0930 (!) 140/75 -- -- 95 -- 93 % --   09/10/24 0915 -- -- -- 101 -- 93 % --   09/10/24 0900 (!) 141/74 -- -- 90 -- 93 % --   09/10/24 0845 -- -- -- 87 -- 93 % --   09/10/24 0830 (!) 142/71 -- -- 88 -- 93 % --   09/10/24 0815 -- -- -- 98 -- 94 % --   09/10/24 0800 (!) 147/77 -- -- 94 -- 93 % --   09/10/24 0730 (!) 144/74 99.3  F (37.4  C) -- 98 -- 94 % --   09/10/24 0720 (!) 140/79 -- -- 100 -- 94 % --   09/10/24 0535 134/78 -- -- 92 -- 97 % --   09/10/24 0422 131/79 -- -- 102 -- 96 % --   09/10/24 0028 136/87 -- -- 98 -- 97 % --   09/10/24 0000 (!) 147/86 -- -- 90 -- 99 % --   09/09/24 2139 (!)  154/89 98.1  F (36.7  C) Oral 110 16 96 % (!) 152.4 kg (336 lb)        No intake or output data in the 24 hours ending 09/10/24 1004   Constitutional:   awake, alert, cooperative, no apparent distress, and appears stated age       Eyes:   PERRL, conjunctiva/corneas clear, EOM's intact; no scleral edema or icterus noted        ENT:   Normocephalic, without obvious abnormality, atraumatic, Lips, mucosa, and tongue normal        Lungs:   Normal respiratory effort, no accessory muscle use       Cardiovascular:   Regular rate and rhythm       Abdomen:   Soft, tender to palpation over RUQ. No rebound or guarding. Positive Servin's sign.       Musculoskeletal:   No obvious swelling, bruising or deformity       Skin:   Skin color and texture normal for patient, no rashes or lesions              Data:         All imaging studies reviewed by me.    Results for orders placed or performed during the hospital encounter of 09/09/24 (from the past 24 hour(s))   CBC with platelets differential    Narrative    The following orders were created for panel order CBC with platelets differential.  Procedure                               Abnormality         Status                     ---------                               -----------         ------                     CBC with platelets and d...[[id number]]  Abnormal            Final result                 Please view results for these tests on the individual orders.   Basic metabolic panel   Result Value Ref Range    Sodium 140 135 - 145 mmol/L    Potassium 3.8 3.4 - 5.3 mmol/L    Chloride 103 98 - 107 mmol/L    Carbon Dioxide (CO2) 23 22 - 29 mmol/L    Anion Gap 14 7 - 15 mmol/L    Urea Nitrogen 13.1 6.0 - 20.0 mg/dL    Creatinine 0.86 0.67 - 1.17 mg/dL    GFR Estimate >90 >60 mL/min/1.73m2    Calcium 8.9 8.8 - 10.4 mg/dL    Glucose 95 70 - 99 mg/dL   Hepatic function panel   Result Value Ref Range    Protein Total 7.6 6.4 - 8.3 g/dL    Albumin 4.5 3.5 - 5.2 g/dL    Bilirubin Total 0.9  <=1.2 mg/dL    Alkaline Phosphatase 76 40 - 150 U/L    AST 26 0 - 45 U/L    ALT 28 0 - 70 U/L    Bilirubin Direct 0.28 0.00 - 0.30 mg/dL   Lipase   Result Value Ref Range    Lipase 18 13 - 60 U/L   CBC with platelets and differential   Result Value Ref Range    WBC Count 12.4 (H) 4.0 - 11.0 10e3/uL    RBC Count 5.17 4.40 - 5.90 10e6/uL    Hemoglobin 15.5 13.3 - 17.7 g/dL    Hematocrit 44.4 40.0 - 53.0 %    MCV 86 78 - 100 fL    MCH 30.0 26.5 - 33.0 pg    MCHC 34.9 31.5 - 36.5 g/dL    RDW 12.4 10.0 - 15.0 %    Platelet Count 205 150 - 450 10e3/uL    % Neutrophils 76 %    % Lymphocytes 13 %    % Monocytes 9 %    % Eosinophils 1 %    % Basophils 0 %    % Immature Granulocytes 0 %    NRBCs per 100 WBC 0 <1 /100    Absolute Neutrophils 9.4 (H) 1.6 - 8.3 10e3/uL    Absolute Lymphocytes 1.6 0.8 - 5.3 10e3/uL    Absolute Monocytes 1.2 0.0 - 1.3 10e3/uL    Absolute Eosinophils 0.1 0.0 - 0.7 10e3/uL    Absolute Basophils 0.1 0.0 - 0.2 10e3/uL    Absolute Immature Granulocytes 0.0 <=0.4 10e3/uL    Absolute NRBCs 0.0 10e3/uL   UA with Microscopic reflex to Culture    Specimen: Urine, Clean Catch   Result Value Ref Range    Color Urine Yellow Colorless, Straw, Light Yellow, Yellow    Appearance Urine Clear Clear    Glucose Urine Negative Negative mg/dL    Bilirubin Urine Negative Negative    Ketones Urine 150 (A) Negative mg/dL    Specific Gravity Urine 1.030 1.001 - 1.030    Blood Urine 0.03 mg/dL (A) Negative    pH Urine 5.5 5.0 - 7.0    Protein Albumin Urine 30 (A) Negative mg/dL    Urobilinogen Urine <2.0 <2.0 mg/dL    Nitrite Urine Negative Negative    Leukocyte Esterase Urine Negative Negative    Mucus Urine Present (A) None Seen /LPF    Amorphous Crystals Urine Few (A) None Seen /HPF    RBC Urine 2 <=2 /HPF    WBC Urine 1 <=5 /HPF    Narrative    Urine Culture not indicated   US Abdomen Limited (RUQ)    Narrative    EXAM: US ABDOMEN LIMITED  LOCATION: [facility]  DATE: 9/10/2024    INDICATION: RUQ  pain, worse and now constant  COMPARISON: None.  TECHNIQUE: Limited abdominal ultrasound.    FINDINGS:    GALLBLADDER: Gallstones and sludge within the gallbladder. No wall thickening, or pericholecystic fluid. Negative sonographic Servin's sign.    BILE DUCTS: No biliary dilatation. The common duct measures 3.5 mm.    LIVER: Increased echogenicity from diffuse fatty infiltration. No focal mass. Normal flow in the main portal vein.    RIGHT KIDNEY: No hydronephrosis.    PANCREAS: Not visualized secondary to overlying bowel gas.    No ascites.      Impression    IMPRESSION:  1.  Cholelithiasis without sonographic evidence of acute cholecystitis.  2.  Hepatic steatosis.  3.  Pancreas not visualized secondary to overlying bowel gas.     CT Abdomen Pelvis w/o Contrast    Narrative    EXAM: CT ABDOMEN AND PELVIS WITHOUT CONTRAST - RENAL STONE PROTOCOL  LOCATION: [facility]  DATE/TIME: 9/10/2024 4:42 AM CDT    INDICATION: Right flank pain and hematuria.  COMPARISON: None.    TECHNIQUE: CT scan of the abdomen and pelvis was performed without oral or IV contrast. Multiplanar reformats were obtained. Dose reduction techniques were used.  CONTRAST: None.    FINDINGS:    LOWER CHEST: A few curvilinear opacities in bilateral lung bases, likely representing atelectasis and/or scarring.    HEPATOBILIARY: A few tiny gallstones versus sludge in the gallbladder.    SPLEEN: Unremarkable.    PANCREAS: Unremarkable.    ADRENAL GLANDS: Unremarkable.    KIDNEYS/BLADDER: No renal or ureteral calculi. No dilatation of the intrarenal collecting systems or ureters. No visualized bladder calculi.    BOWEL: The stomach, small and large bowel are normal in caliber. Mild ill-defined haziness is present in the fat adjacent to the lateral aspect of the hepatic flexure of colon (for example, series 3 image 105). There may be a tiny diverticulum in this   region (series 3 image 105). A small appendicolith is present in the  appendiceal lumen. The appendix is otherwise unremarkable and there is no convincing evidence of acute appendicitis. No visualized bowel wall thickening, pneumatosis or free   intraperitoneal gas.    LYMPH NODES: Unremarkable.    PELVIC ORGANS: No acute findings.    MUSCULOSKELETAL: No acute findings.    OTHER: A very small amount of free fluid in the pelvis and a trace amount of perihepatic free fluid. Small paraumbilical hernia containing fat.      Impression    IMPRESSION:   1. Mild ill-defined haziness is present in the fat abutting the lateral aspect of the hepatic flexure of colon. This is nonspecific, but suggestive of an infectious or inflammatory process. There appears to be a tiny diverticulum from the colon in this   region and therefore these findings could relate to underlying diverticulitis. If the patient's symptoms persist or worsen, a follow-up CT scan would be useful for reevaluation.  2. A very small amount of nonspecific free fluid in the pelvis and a trace amount of perihepatic free fluid.  3. No other cause of acute pain identified in the abdomen or pelvis. No renal or ureteral calculi or evidence of urinary obstruction and no convincing evidence of acute appendicitis.                      Davina Dutta PA-C  [facility]  Surgery [address]  [place] [address]?  Office: [phone]

## 2024-09-10 NOTE — ANESTHESIA POSTPROCEDURE EVALUATION
Patient: Sunil Butts    Procedure: Procedure(s):  CHOLECYSTECTOMY, LAPAROSCOPIC       Anesthesia Type:  General    Note:  Disposition: Outpatient   Postop Pain Control: Uneventful            Sign Out: Well controlled pain   PONV: No   Neuro/Psych: Uneventful            Sign Out: Acceptable/Baseline neuro status   Airway/Respiratory: Uneventful            Sign Out: Acceptable/Baseline resp. status   CV/Hemodynamics: Uneventful            Sign Out: Acceptable CV status   Other NRE:    DID A NON-ROUTINE EVENT OCCUR?            Last vitals:  Vitals Value Taken Time   /74 09/10/24 1430   Temp 37.1  C (98.8  F) 09/10/24 1430   Pulse 92 09/10/24 1438   Resp 25 09/10/24 1352   SpO2 93 % 09/10/24 1438   Vitals shown include unfiled device data.    Electronically Signed By: Cam Rivera MD  September 10, 2024  3:16 PM

## 2024-09-10 NOTE — ANESTHESIA PREPROCEDURE EVALUATION
Anesthesia Pre-Procedure Evaluation    Patient: Sunil Butts   MRN: [id number] : 1985        Procedure : Procedure(s):  CHOLECYSTECTOMY, LAPAROSCOPIC          History reviewed. No pertinent past medical history.   History reviewed. No pertinent surgical history.   Allergies   Allergen Reactions    Bee Venom Unknown and Anaphylaxis    Seasonal Allergies Unknown     Runny nose and eyes   Also dogs      Social History     Tobacco Use    Smoking status: Never    Smokeless tobacco: Never   Substance Use Topics    Alcohol use: Not on file      Wt Readings from Last 1 Encounters:   24 (!) 152.4 kg (336 lb)        Anesthesia Evaluation            ROS/MED HX  ENT/Pulmonary:  - neg pulmonary ROS   (+)                     Intermittent, asthma                  Neurologic:  - neg neurologic ROS     Cardiovascular:  - neg cardiovascular ROS     METS/Exercise Tolerance:     Hematologic:       Musculoskeletal:       GI/Hepatic:  - neg GI/hepatic ROS   (+)          cholecystitis/cholelithiasis,          Renal/Genitourinary:  - neg Renal ROS     Endo:  - neg endo ROS   (+)               Obesity,       Psychiatric/Substance Use:       Infectious Disease:       Malignancy:       Other:            Physical Exam    Airway        Mallampati: II   TM distance: > 3 FB   Neck ROM: full   Mouth opening: > 3 cm    Respiratory Devices and Support         Dental       (+) Minor Abnormalities - some fillings, tiny chips      Cardiovascular          Rhythm and rate: regular and normal     Pulmonary           breath sounds clear to auscultation           OUTSIDE LABS:  CBC:   Lab Results   Component Value Date    WBC 12.4 (H) 09/10/2024    WBC 8.1 2023    HGB 15.5 09/10/2024    HGB 14.1 2023    HCT 44.4 09/10/2024    HCT 42.3 2023     09/10/2024     2023     BMP:   Lab Results   Component Value Date     09/10/2024     2024    POTASSIUM 3.8 09/10/2024    POTASSIUM 3.9  "09/06/2024    CHLORIDE 103 09/10/2024    CHLORIDE 105 09/06/2024    CO2 23 09/10/2024    CO2 26 09/06/2024    BUN 13.1 09/10/2024    BUN 10.8 09/06/2024    CR 0.86 09/10/2024    CR 0.91 09/06/2024    GLC 95 09/10/2024    GLC 95 09/06/2024     COAGS: No results found for: \"PTT\", \"INR\", \"FIBR\"  POC: No results found for: \"BGM\", \"HCG\", \"HCGS\"  HEPATIC:   Lab Results   Component Value Date    ALBUMIN 4.5 09/10/2024    PROTTOTAL 7.6 09/10/2024    ALT 28 09/10/2024    AST 26 09/10/2024    ALKPHOS 76 09/10/2024    BILITOTAL 0.9 09/10/2024     OTHER:   Lab Results   Component Value Date    ABHISHEK 8.9 09/10/2024    LIPASE 18 09/10/2024       Anesthesia Plan    ASA Status:  2, emergent    NPO Status:  NPO Appropriate    Anesthesia Type: General.     - Airway: ETT         Techniques and Equipment:       - Drips/Meds: Ketamine     Consents    Anesthesia Plan(s) and associated risks, benefits, and realistic alternatives discussed. Questions answered and patient/representative(s) expressed understanding.     - Discussed:     - Discussed with:  Patient, Spouse      - Extended Intubation/Ventilatory Support Discussed: No.      - Patient is DNR/DNI Status: No     Use of blood products discussed: No .     Postoperative Care    Pain management: IV analgesics.   PONV prophylaxis: Ondansetron (or other 5HT-3), Dexamethasone or Solumedrol     Comments:    Other Comments: Urgent lap keith. Ketamine for adjunct.            Cam Rivera MD    I have reviewed the pertinent notes and labs in the chart from the past 30 days and (re)examined the patient.  Any updates or changes from those notes are reflected in this note.              # Obesity: Estimated body mass index is 39.84 kg/m  as calculated from the following:    Height as of 9/6/24: 1.956 m (6' 5\").    Weight as of this encounter: 152.4 kg (336 lb).      "

## 2024-09-10 NOTE — PLAN OF CARE
Problem: Adult Inpatient Plan of Care  Goal: Plan of Care Review  Description: The Plan of Care Review/Shift note should be completed every shift.  The Outcome Evaluation is a brief statement about your assessment that the patient is improving, declining, or no change.  This information will be displayed automatically on your shift  note.  Outcome: Progressing   Goal Outcome Evaluation:       Pt 3 lap sites dry. OLIVIER dressing dry. IV antibiotic's per/order. Pt voided 600 ml's.  Using I/S at 2000. Tolerated fair. Hypoactive bowel sounds. No gas. Tolerating clears.

## 2024-09-11 VITALS
HEART RATE: 108 BPM | BODY MASS INDEX: 39.84 KG/M2 | TEMPERATURE: 98 F | SYSTOLIC BLOOD PRESSURE: 131 MMHG | DIASTOLIC BLOOD PRESSURE: 80 MMHG | RESPIRATION RATE: 18 BRPM | WEIGHT: 315 LBS | OXYGEN SATURATION: 93 %

## 2024-09-11 LAB
GLUCOSE BLDC GLUCOMTR-MCNC: 101 MG/DL (ref 70–99)
PATH REPORT.COMMENTS IMP SPEC: NORMAL
PATH REPORT.COMMENTS IMP SPEC: NORMAL
PATH REPORT.FINAL DX SPEC: NORMAL
PATH REPORT.GROSS SPEC: NORMAL
PATH REPORT.MICROSCOPIC SPEC OTHER STN: NORMAL
PATH REPORT.RELEVANT HX SPEC: NORMAL
PHOTO IMAGE: NORMAL

## 2024-09-11 PROCEDURE — 99024 POSTOP FOLLOW-UP VISIT: CPT | Performed by: PHYSICIAN ASSISTANT

## 2024-09-11 PROCEDURE — 96376 TX/PRO/DX INJ SAME DRUG ADON: CPT

## 2024-09-11 PROCEDURE — 250N000013 HC RX MED GY IP 250 OP 250 PS 637: Performed by: SURGERY

## 2024-09-11 PROCEDURE — 82962 GLUCOSE BLOOD TEST: CPT

## 2024-09-11 PROCEDURE — 96372 THER/PROPH/DIAG INJ SC/IM: CPT | Performed by: SURGERY

## 2024-09-11 PROCEDURE — G0378 HOSPITAL OBSERVATION PER HR: HCPCS

## 2024-09-11 PROCEDURE — 250N000011 HC RX IP 250 OP 636: Performed by: SURGERY

## 2024-09-11 RX ORDER — ACETAMINOPHEN 325 MG/1
650-975 TABLET ORAL EVERY 6 HOURS PRN
COMMUNITY
Start: 2024-09-11 | End: 2024-10-07

## 2024-09-11 RX ORDER — TRAMADOL HYDROCHLORIDE 50 MG/1
50 TABLET ORAL EVERY 6 HOURS PRN
Qty: 10 TABLET | Refills: 0 | Status: SHIPPED | OUTPATIENT
Start: 2024-09-11 | End: 2024-10-07

## 2024-09-11 RX ORDER — AMOXICILLIN 250 MG
1 CAPSULE ORAL 2 TIMES DAILY
Qty: 60 TABLET | Refills: 0 | Status: SHIPPED | OUTPATIENT
Start: 2024-09-11 | End: 2024-10-07

## 2024-09-11 RX ADMIN — MOMETASONE FUROATE 2 PUFF: 100 AEROSOL RESPIRATORY (INHALATION) at 08:46

## 2024-09-11 RX ADMIN — POLYETHYLENE GLYCOL 3350 17 G: 17 POWDER, FOR SOLUTION ORAL at 08:46

## 2024-09-11 RX ADMIN — TRAMADOL HYDROCHLORIDE 50 MG: 50 TABLET, COATED ORAL at 10:57

## 2024-09-11 RX ADMIN — SENNOSIDES AND DOCUSATE SODIUM 1 TABLET: 8.6; 5 TABLET ORAL at 08:46

## 2024-09-11 RX ADMIN — ENOXAPARIN SODIUM 40 MG: 40 INJECTION SUBCUTANEOUS at 08:46

## 2024-09-11 RX ADMIN — HYDROMORPHONE HYDROCHLORIDE 0.4 MG: 0.2 INJECTION, SOLUTION INTRAMUSCULAR; INTRAVENOUS; SUBCUTANEOUS at 06:56

## 2024-09-11 RX ADMIN — ACETAMINOPHEN 975 MG: 325 TABLET ORAL at 12:54

## 2024-09-11 RX ADMIN — ACETAMINOPHEN 975 MG: 325 TABLET ORAL at 03:40

## 2024-09-11 RX ADMIN — PIPERACILLIN AND TAZOBACTAM 3.38 G: 3; .375 INJECTION, POWDER, FOR SOLUTION INTRAVENOUS at 06:30

## 2024-09-11 RX ADMIN — TRAMADOL HYDROCHLORIDE 50 MG: 50 TABLET, COATED ORAL at 03:40

## 2024-09-11 ASSESSMENT — ACTIVITIES OF DAILY LIVING (ADL)
ADLS_ACUITY_SCORE: 21
ADLS_ACUITY_SCORE: 20
ADLS_ACUITY_SCORE: 21
ADLS_ACUITY_SCORE: 20
ADLS_ACUITY_SCORE: 21
ADLS_ACUITY_SCORE: 21
ADLS_ACUITY_SCORE: 20
ADLS_ACUITY_SCORE: 21

## 2024-09-11 NOTE — PROGRESS NOTES
General Surgery Progress Note:    Hospital Day # 0    ASSESSMENT:  1. Symptomatic cholelithiasis    2. Diverticulitis of colon        Sunil Butts is a 39 year old male who is s/p laparoscopic cholecystectomy found to have perforated cholecystitis on 9/10/24 with Dr. Interiano. Patient doing well post-operatively. Drain with bilious output. Tolerating a full liquid diet without issues. Okay for regular diet and likely home later today pending pain control and PO tolerance.    PLAN:  - Regular diet as tolerated  - Continue IV antibiotics. Plan to discharge home with PO Augmentin x3 days  - PO pain control  - Increase activity and encourage ambulation to promote bowel function  - Monitor drain. Please provide drain teaching, drain sponges and measuring cup for home  - Likely home later today pending diet tolerance and pain control. Will arrange outpatient follow-up for next week for drain removal    SUBJECTIVE:   He is feeling okay this morning.  Patient reports burning pain at the subxiphoid incision yesterday evening, now improved.  Endorses mild incisional soreness.  Denies fever, chills, nausea, vomiting.  Tolerating a full liquid diet without issues.  Ambulating and voiding independently.  No return of bowel function yet.      Patient Vitals for the past 24 hrs:   BP Temp Temp src Pulse Resp SpO2   09/11/24 0857 -- -- -- -- -- 93 %   09/11/24 0731 122/65 98.3  F (36.8  C) Oral 76 18 93 %   09/11/24 0343 134/74 97.7  F (36.5  C) Oral 89 16 93 %   09/11/24 0030 134/70 98  F (36.7  C) Oral 83 18 93 %   09/10/24 2206 135/72 98.3  F (36.8  C) Oral 92 18 92 %   09/10/24 1659 (!) 142/70 99  F (37.2  C) Oral 101 20 92 %   09/10/24 1524 138/68 99.1  F (37.3  C) Oral 87 20 92 %   09/10/24 1454 129/74 98.4  F (36.9  C) -- 90 18 94 %   09/10/24 1430 (!) 156/74 98.8  F (37.1  C) Core 88 -- 92 %   09/10/24 1415 (!) 154/69 -- -- 89 -- 93 %   09/10/24 1400 (!) 153/69 -- -- 97 -- 93 %   09/10/24 1345 (!) 152/71 98.8  F (37.1   C) -- 90 25 94 %   09/10/24 1330 (!) 165/74 99.3  F (37.4  C) Core 96 17 94 %   09/10/24 1315 (!) 172/84 99.5  F (37.5  C) Core 101 24 94 %   09/10/24 1313 (!) 181/97 99.7  F (37.6  C) -- 104 25 94 %   09/10/24 1300 (!) 179/86 99.3  F (37.4  C) Core 107 20 92 %   09/10/24 1245 (!) 176/85 99.3  F (37.4  C) Core 106 24 94 %   09/10/24 1238 (!) 175/82 99.3  F (37.4  C) Core 108 23 95 %         PHYSICAL EXAM:  General: patient seen resting in bed, no acute distress  Resp: no respiratory distress, breathing comfortably on room air  Abdomen: Soft, mildly tender to palpation over incisions.  Lap sites clean/dry/intact with Steri-Strips and overlying Band-Aids.  Drains: Surgical Hussain drain x 1 with serosanguineous and bilious output.  Output by Drain (mL) 09/09/24 0700 - 09/09/24 1459 09/09/24 1500 - 09/09/24 2259 09/09/24 2300 - 09/10/24 0659 09/10/24 0700 - 09/10/24 1459 09/10/24 1500 - 09/10/24 2259 09/10/24 2300 - 09/11/24 0659 09/11/24 0700 - 09/11/24 1050   Closed/Suction Drain 1 Right Abdomen Bulb 19 [place]    10 110 30    Extremities: warm and well perfused    09/10 0700 - 09/11 0659  In: 850 [I.V.:850]  Out: 1555 [Urine:1400; Drains:150]    Admission on 09/09/2024   Component Date Value    Sodium 09/10/2024 140     Potassium 09/10/2024 3.8     Chloride 09/10/2024 103     Carbon Dioxide (CO2) 09/10/2024 23     Anion Gap 09/10/2024 14     Urea Nitrogen 09/10/2024 13.1     Creatinine 09/10/2024 0.86     GFR Estimate 09/10/2024 >90     Calcium 09/10/2024 8.9     Glucose 09/10/2024 95     Protein Total 09/10/2024 7.6     Albumin 09/10/2024 4.5     Bilirubin Total 09/10/2024 0.9     Alkaline Phosphatase 09/10/2024 76     AST 09/10/2024 26     ALT 09/10/2024 28     Bilirubin Direct 09/10/2024 0.28     Lipase 09/10/2024 18     WBC Count 09/10/2024 12.4 (H)     RBC Count 09/10/2024 5.17     Hemoglobin 09/10/2024 15.5     Hematocrit 09/10/2024 44.4     MCV 09/10/2024 86     MCH 09/10/2024 30.0     MCHC 09/10/2024 34.9      RDW 09/10/2024 12.4     Platelet Count 09/10/2024 205     % Neutrophils 09/10/2024 76     % Lymphocytes 09/10/2024 13     % Monocytes 09/10/2024 9     % Eosinophils 09/10/2024 1     % Basophils 09/10/2024 0     % Immature Granulocytes 09/10/2024 0     NRBCs per 100 WBC 09/10/2024 0     Absolute Neutrophils 09/10/2024 9.4 (H)     Absolute Lymphocytes 09/10/2024 1.6     Absolute Monocytes 09/10/2024 1.2     Absolute Eosinophils 09/10/2024 0.1     Absolute Basophils 09/10/2024 0.1     Absolute Immature Granul* 09/10/2024 0.0     Absolute NRBCs 09/10/2024 0.0     Color Urine 09/10/2024 Yellow     Appearance Urine 09/10/2024 Clear     Glucose Urine 09/10/2024 Negative     Bilirubin Urine 09/10/2024 Negative     Ketones Urine 09/10/2024 150 (A)     Specific Gravity Urine 09/10/2024 1.030     Blood Urine 09/10/2024 0.03 mg/dL (A)     pH Urine 09/10/2024 5.5     Protein Albumin Urine 09/10/2024 30 (A)     Urobilinogen Urine 09/10/2024 <2.0     Nitrite Urine 09/10/2024 Negative     Leukocyte Esterase Urine 09/10/2024 Negative     Mucus Urine 09/10/2024 Present (A)     Amorphous Crystals Urine 09/10/2024 Few (A)     RBC Urine 09/10/2024 2     WBC Urine 09/10/2024 1     Creatinine 09/10/2024 0.80     GFR Estimate 09/10/2024 >90     Hold Specimen 09/10/2024 [facility]     GLUCOSE BY METER POCT 09/11/2024 101 (H)         Davina Dutta PA-C  [address]  [address] [place] [id number]?  Office: [phone]

## 2024-09-11 NOTE — PLAN OF CARE
PRIMARY DIAGNOSIS: BILIARY COLIC/UNCOMPLICATED EARLY ACUTE CHOLECYSTITIS  OUTPATIENT/OBSERVATION GOALS TO BE MET BEFORE DISCHARGE:    1. Pain status: Improved but still requiring IV narcotics.  2. Stable vital signs and labs (if performed) at disposition: Yes  3. Tolerating adequate PO diet: Yes-fulls on right now  4. Successful cholecystectomy or clear follow up plan with General Surgery team if immediate surgery not performed Yes  5. ADLs back to baseline?  No  6. Activity and level of assistance: Up with standby assistance.  7. Barriers to discharge noted Yes    Discharge Planner Nurse   Safe discharge environment identified: Yes  Barriers to discharge: Yes pain control and diet advancement        Entered by: Jolynn Weeks RN 09/10/2024 9:28 PM     Please review provider order for any additional goals.   Nurse to notify provider when observation goals have been met and patient is ready for discharge.    Goal Outcome Evaluation:  Patient is alert and oriented x 4 and able to make needs known. Patients pain controlled with IV dilaudid x 1. Dressing on abdomen clean, dry and intact.

## 2024-09-11 NOTE — PLAN OF CARE
PRIMARY DIAGNOSIS: BILIARY COLIC/UNCOMPLICATED EARLY ACUTE CHOLECYSTITIS  OUTPATIENT/OBSERVATION GOALS TO BE MET BEFORE DISCHARGE:    1. Pain status: Improved but still requiring IV narcotics.  2. Stable vital signs and labs (if performed) at disposition: Yes  3. Tolerating adequate PO diet:  Full liquid diet  4. Successful cholecystectomy or clear follow up plan with General Surgery team if immediate surgery not performed Yes  5. ADLs back to baseline?  No  6. Activity and level of assistance: Up with standby assistance.  7. Barriers to discharge noted Yes     Discharge Planner Nurse   Safe discharge environment identified: Yes  Barriers to discharge: Yes       Entered by: Sheri Santos RN 09/11/2024 6:58 AM     Please review provider order for any additional goals.   Nurse to notify provider when observation goals have been met and patient is ready for discharge.  Goal Outcome Evaluation:

## 2024-09-11 NOTE — DISCHARGE SUMMARY
Discharge Provider: RIKKI BURNS PA-C  Admission Date: 9/9/2024  Discharge Date: 9/11/2024     Primary Diagnosis at Discharge:   Patient Active Problem List   Diagnosis    Diverticulitis of colon    Symptomatic cholelithiasis      Disposition: Home or Self Care  Condition at Discharge: Stable     Surgery: Laparoscopic cholecystectomy    Hospital Summary:   Sunil Butts was admitted for acute cholecystitis and underwent an uncomplicated laparoscopic cholecystectomy found to have perforated cholecystitis and drain left in place.  Following a brief recovery in the PACU, he was transferred to the Med/Surg floor for the remainder of his stay.  His post operative course was uneventful, and his diet was advanced as tolerated.  On POD #1 he was discharged home tolerating a general diet, ambulating without assistance, and pain controlled with oral analgesics.        Discharge Medications:      Medication List        Started      acetaminophen 325 MG tablet  Commonly known as: TYLENOL  650-975 mg, Oral, EVERY 6 HOURS PRN     amoxicillin-clavulanate 875-125 MG tablet  Commonly known as: AUGMENTIN  1 tablet, Oral, 2 TIMES DAILY     senna-docusate 8.6-50 MG tablet  Commonly known as: SENOKOT-S/PERICOLACE  1 tablet, Oral, 2 TIMES DAILY     traMADol 50 MG tablet  Commonly known as: ULTRAM  50 mg, Oral, EVERY 6 HOURS PRN            Discharge Instructions:  Follow up appointment with Primary Care Physician: Anita Reynoso  Follow up appointment with General Surgery in: 1 week    It is our practice to have all patients follow up with us 2-3 weeks after their surgery to ensure they are recovering well.  For straightforward laparoscopic procedures, this can be done either in clinic as a scheduled follow up appointment, or over the phone.  If you would like a scheduled follow up appointment in clinic, please call us at [phone] to schedule an appointment at your convenience.  If you would prefer to follow up  with us by phone please let us know so that we may contact you 2-3 weeks following your procedure.    Post operative instructions:   Diet: Regular diet    Activity: You should continue to be active at home including ambulating frequently.  If possible try to limit the amount of time spent in bed.    Restrictions: You should avoid lifting anything more than 20  pounds or strenuous physical activity for a minimum of 2 weeks.  This is to help reduce the risk of hernia  formation at your port sites.  Walking does not count as strenuous physical activity.  You are safe to walk up and down stairs.  Following 2 weeks you may resume all normal physical activity.    Wound care: You may remove your outer dressings after a period of 48 hours. The small white strips on the incisions act like artificial scabs, and will begin to peel at the edges at around 7-10 days. These can then be removed.    Bathing: You may shower after 48 hours from surgery. It is ok to get your incisions wet, but avoid rubbing them. Avoid soaking in bath tubs, or swimming in lakes, pools, or streams for 2 weeks following surgery.      Drain care: You have a surgical drain that will remain in place when you leave the hospital. Please keep a log of the daily output from your drain to bring to your follow-up appointment. Please note any changes in character of your drain output.      Davina Dutta PA-C  [facility]  Surgery [address]  [place] [address]?  Office: [phone]

## 2024-09-11 NOTE — PLAN OF CARE
PRIMARY DIAGNOSIS: ACUTE PAIN  OUTPATIENT/OBSERVATION GOALS TO BE MET BEFORE DISCHARGE:  1. Pain Status: Improved-controlled with oral pain medications.    2. Return to near baseline physical activity: Yes    3. Cleared for discharge by consultants (if involved): Yes    Discharge Planner Nurse   Safe discharge environment identified: Yes  Barriers to discharge: No       Entered by: Krista Dunlap RN 09/11/2024 2:13 PM   Paging surgical PA that pt ate full bagel, yogurt, and banana for lunch and no discomfort noted. Will due OLIVIER drain teach back with pt and wife prior to discharge.   Please review provider order for any additional goals.   Nurse to notify provider when observation goals have been met and patient is ready for discharge.Goal Outcome Evaluation:

## 2024-09-11 NOTE — PROGRESS NOTES
PRIMARY DIAGNOSIS: ACUTE PAIN  OUTPATIENT/OBSERVATION GOALS TO BE MET BEFORE DISCHARGE:  1. Pain Status: Improved-controlled with oral pain medications.    2. Return to near baseline physical activity: Yes    3. Cleared for discharge by consultants (if involved): No    Discharge Planner Nurse   Safe discharge environment identified: Yes  Barriers to discharge: No       Entered by: Yola Mckeon RN 09/11/2024 11:12 AM     Please review provider order for any additional goals.   Nurse to notify provider when observation goals have been met and patient is ready for discharge.    Pt informed writer pain to abd area much improved. Increased pain noted with movement/coughing however tolerable. PRN tramadol admin. Monitoring for effectiveness.    Pt ambulated in hallway with BRIANNE jones. Drain remains in place. Pt advanced by surgery to reg diet for lunch. Will assess food advancement tolerance for potential discharge after.

## 2024-09-11 NOTE — PLAN OF CARE
PRIMARY DIAGNOSIS: BILIARY COLIC/UNCOMPLICATED EARLY ACUTE CHOLECYSTITIS  OUTPATIENT/OBSERVATION GOALS TO BE MET BEFORE DISCHARGE:    1. Pain status: Improved but still requiring IV narcotics.  2. Stable vital signs and labs (if performed) at disposition: Yes  3. Tolerating adequate PO diet:  Full liquid  4. Successful cholecystectomy or clear follow up plan with General Surgery team if immediate surgery not performed Yes  5. ADLs back to baseline?  No  6. Activity and level of assistance: Up with standby assistance.  7. Barriers to discharge noted Yes Yes    Discharge Planner Nurse   Safe discharge environment identified: Yes  Barriers to discharge: Yes       Entered by: Sheri Santos RN 09/11/2024 1:08 AM     Please review provider order for any additional goals.   Nurse to notify provider when observation goals have been met and patient is ready for discharge.  Goal Outcome Evaluation:       Prn IV Dilaudid 0.4mg for pain.

## 2024-09-16 ENCOUNTER — OFFICE VISIT (OUTPATIENT)
Dept: SURGERY | Facility: CLINIC | Age: 39
End: 2024-09-16
Payer: COMMERCIAL

## 2024-09-16 DIAGNOSIS — Z48.89 POSTOPERATIVE VISIT: Primary | ICD-10-CM

## 2024-09-16 NOTE — PROGRESS NOTES
HPI: Patient is here for follow-up of a laparoscopic cholecystectomy with Dr. Interiano on 9/16/24 for perforated cholecystitis with placement of drain in the RUQ.  When he left the hospital the drain was mostly yellow, a small tinge of green.  Since then he has documented roughly 20 cc out daily and light yellow/pale yellow/yellow in nature.  Patient's wife is with him and said that yesterday he did more activity, today she notices the yellow is the same in shade but it is a little bit tinged with almost green or darker yellow in it.  This is the first time she has noticed this since he left the hospital.  He is doing well. Pain is well controlled. No difficulties with the surgical wound(s). He is eating well and denies fever or chills.       Patient has also noticed that his mid thigh anterior region has had some numbness and some discomfort in it.  Patient states that he does not usually sleep on his back, has been sleeping on his back and states the discomfort feelings is better with raising his legs up on a wedge.  States he is never had this prior to surgery, does not interfere with his mobility of his leg.  Has not noticed shooting pains down his leg past the anterior thigh and does not really have shooting pains.    There were no vitals taken for this visit.    EXAM:  General: Appears well, sitting upright in chair and conversant  Abdomen: Soft, non-tender, non-distended. No rebound or guarding.  Surgical wounds: Incisions healing well, clean/dry/intact without induration or drainage.  Steri-Strips are in place.    OLIVIER drain with bandage clean, dry, intact and stitch in place without erythema on surrounding skin.  In the bulb which is to suction there is relatively serous appearing fluid however with the green tinge to it.  Negative for soapy feeling on gloves however does appear green-tinged on gloves as well.  There is a larger clot which is at the entrance point of the bulb, does not appear to be obstructing  flow into the bulb.    Surgical Pathology Report:  Final Diagnosis   GALLBLADDER, LAPAROSCOPIC CHOLECYSTECTOMY:        1.  CHRONIC CHOLECYSTITIS        2.  CHOLELITHIASIS WITH FOCALLY IMPACTED CYSTIC DUCT STONES        3.  NO EVIDENCE OF DYSPLASIA OR MALIGNANCY       Assessment/Plan: Doing well after surgery, unfortunately OLIVIER surgical drain will remain for now.  Scheduled patient for a follow-up next week.  If he continues to have serous drainage/no drainage from the drain can call earlier to be evaluated for earlier removal.  If he notices the green becoming more apparent or darkening / RUQ symptoms or pain / fever or chills should also call and discuss.    -Continue monitoring the anterior thigh numbness/tingling, expect this is nerve related and if improved with the wedge lying in bed or anytime lying flat would continue with this.  Would continue with stretching exercises as well to relieve any pressure on the lower back.  Would expect this to improve over time, can reevaluate at visit next week as well    BECKA Sheppard [facility] General Surgery  [facility], [address]  [place] [address]  Office [phone]

## 2024-09-19 ENCOUNTER — TELEPHONE (OUTPATIENT)
Dept: CARE COORDINATION | Facility: CLINIC | Age: 39
End: 2024-09-19
Payer: COMMERCIAL

## 2024-09-19 NOTE — TELEPHONE ENCOUNTER
Care Coordination: 9/19/2024    Date of discharge: 9/11/2024  Facility of discharge: [facility]  Patient concerns about condition: No concerns at this time.  Patient concerns about medications: No concerns at this time.  Full med reconciliation will be completed at clinic visit.  Patient concerns about transitioning: No concerns at this time.  Clinic office visit appointment date: 10/7/2024 @ 4:10 pm  Patient reminded to bring all medications (prescription and over-the-counter) to clinic appointment: Yes    Using the date of discharge as day 1, the 30th day post discharge is: 10/11/2024        Joshua Gaffney Sr.   Care Coordination  [address] [address]  [email or web address]   Office: [phone] Direct: [phone]  [facility]

## 2024-09-23 ENCOUNTER — OFFICE VISIT (OUTPATIENT)
Dept: SURGERY | Facility: CLINIC | Age: 39
End: 2024-09-23
Payer: COMMERCIAL

## 2024-09-23 VITALS
BODY MASS INDEX: 37.19 KG/M2 | DIASTOLIC BLOOD PRESSURE: 80 MMHG | WEIGHT: 315 LBS | SYSTOLIC BLOOD PRESSURE: 130 MMHG | HEIGHT: 77 IN

## 2024-09-23 DIAGNOSIS — Z48.89 POSTOPERATIVE VISIT: Primary | ICD-10-CM

## 2024-09-23 PROBLEM — E66.01 CLASS 2 SEVERE OBESITY DUE TO EXCESS CALORIES WITH SERIOUS COMORBIDITY IN ADULT (H): Status: ACTIVE | Noted: 2024-09-23

## 2024-09-23 PROBLEM — E66.812 CLASS 2 SEVERE OBESITY DUE TO EXCESS CALORIES WITH SERIOUS COMORBIDITY IN ADULT (H): Status: ACTIVE | Noted: 2024-09-23

## 2024-09-23 PROCEDURE — 99024 POSTOP FOLLOW-UP VISIT: CPT

## 2024-09-23 NOTE — PROGRESS NOTES
HPI: Patient is here for follow-up of a laparoscopic cholecystectomy with Dr. Interiano on 9/10/2024 for perforated cholecystitis with placement of drain in the RUQ. He had followed up in [facility] on 09/16 with concern for new onset green pigmentation of OLIVIER drain during initial postoperative appt. Drain output at that time had been reported 20cc/day as well as stating right thigh numbness tingling sensation, OLIVIER drain left in place. He represents for postoperative follow up appt. Drain log reviewed revealing downtrend of drain OP 10-15 ml/day that is light yellow.  Reports pain has been well-controlled not requiring analgesics.  Shares spontaneous resolution of right anterior thigh numbness tingling sensation without reoccurrence . Denies difficulties with surgical wounds.  He is eating and drinking well denying fever or chills.    There were no vitals taken for this visit.     EXAM:  General: Appears well, sitting upright in chair and conversant  Abdomen: Soft, non-tender, non-distended. No rebound or guarding.  Surgical wounds: Incisions are clean, dry and intact. Right abdominal drain in place with yellow serous drainage (10 ml in bulb on suction).        GALLBLADDER, LAPAROSCOPIC CHOLECYSTECTOMY:        1.  CHRONIC CHOLECYSTITIS        2.  CHOLELITHIASIS WITH FOCALLY IMPACTED CYSTIC DUCT STONES        3.  NO EVIDENCE OF DYSPLASIA OR MALIGNANCY    Assessment/Plan: Doing well after surgery status post laparoscopic cholecystectomy.  P drain with downtrending output that is serous on exam as well as absence of green pigmentation reported on drain log.  Drain removed successfully at bedside and dressed with gauze and tape.  Denies infectious symptomatology with pain well-controlled.  No indication for further postoperative follow-up at this point in time.  Clinic information provided follow-up as needed.    BECKA Ott [facility]  Surgery Clinic [facility]  [address]  [place] [address]?  Office: [phone]

## 2024-10-07 ENCOUNTER — OFFICE VISIT (OUTPATIENT)
Dept: PHARMACY | Facility: CLINIC | Age: 39
End: 2024-10-07

## 2024-10-07 ENCOUNTER — OFFICE VISIT (OUTPATIENT)
Dept: FAMILY MEDICINE | Facility: CLINIC | Age: 39
End: 2024-10-07
Payer: COMMERCIAL

## 2024-10-07 VITALS
HEART RATE: 83 BPM | HEIGHT: 77 IN | TEMPERATURE: 98.3 F | BODY MASS INDEX: 37.19 KG/M2 | DIASTOLIC BLOOD PRESSURE: 86 MMHG | OXYGEN SATURATION: 97 % | SYSTOLIC BLOOD PRESSURE: 133 MMHG | RESPIRATION RATE: 16 BRPM | WEIGHT: 315 LBS

## 2024-10-07 DIAGNOSIS — K80.20 SYMPTOMATIC CHOLELITHIASIS: Primary | ICD-10-CM

## 2024-10-07 DIAGNOSIS — Z90.49 HX LAPAROSCOPIC CHOLECYSTECTOMY: Primary | ICD-10-CM

## 2024-10-07 DIAGNOSIS — J45.41 MODERATE PERSISTENT ASTHMA WITH EXACERBATION: ICD-10-CM

## 2024-10-07 PROCEDURE — 99207 PR NO CHARGE LOS: CPT | Performed by: PHARMACIST

## 2024-10-07 PROCEDURE — 99214 OFFICE O/P EST MOD 30 MIN: CPT | Mod: GC

## 2024-10-07 RX ORDER — BUDESONIDE AND FORMOTEROL FUMARATE DIHYDRATE 160; 4.5 UG/1; UG/1
AEROSOL RESPIRATORY (INHALATION)
Qty: 20.4 G | Refills: 11 | Status: SHIPPED | OUTPATIENT
Start: 2024-10-07

## 2024-10-07 NOTE — PROGRESS NOTES
Clinical Pharmacy Consult:                                                    Sunil Butts is a 39 year old coming in for a TCM medication reconciliation.  He was discharged from [facility] on 9/11/2024 for laparoscopic cholecystectomy.  Patient was seen today by Dr. Anita Reynoso MD for transitional care management.       Reason for Consult: TCM medication reconciliation.    Discussion:   The following medications were started in the hospital:   Started       acetaminophen 325 MG tablet  Commonly known as: TYLENOL  650-975 mg, Oral, EVERY 6 HOURS PRN  Pt reports no longer taking.    amoxicillin-clavulanate 875-125 MG tablet  Commonly known as: AUGMENTIN  1 tablet, Oral, 2 TIMES DAILY   Pt completed 3-day course of antibiotic treatment.   senna-docusate 8.6-50 MG tablet  Commonly known as: SENOKOT-S/PERICOLACE  1 tablet, Oral, 2 TIMES DAILY   Pt reports no longer taking.    traMADol 50 MG tablet  Commonly known as: ULTRAM  50 mg, Oral, EVERY 6 HOURS PRN   Pt reports no longer taking.        The following medications were stopped in the hospital:   None    The following medications were changed in the hospital:   None    Patient continues to take:  Albuterol inhaler, 2 puffs every 6 hrs PRN  Mometasone furoate (ASMANEX HFA) 2 puffs twice daily  Epinephrine injection for bee allergy     This patient's medication list has been updated to reflect their current medications.  Their medications have not been reviewed for indication, effectiveness, safety or convenience. Please see the note today from Dr. Anita Reynoso MD for additional information.    Plan:  As directed by Dr. Anita Reynoso. AVS provided by Dr. Reynoso.  Offered appointment with pharmacy for asthma medication management.  Dr. Reynoso sent prescription for Symbicort SMART therapy and educated patient. Due to uncertainty of insurance coverage, Asmanex and Albuterol were left on the medication list.    Post Discharge Medication  Reconciliation Status: discharge medications reconciled and changed, per note/orders.    This patient seen and discussed with Dr. Yaquelin Palomo RPH.    Sincere Baumann MD SHANTELLE  Resident Physician PGY-2  [employer]     I have verified the content of the note, which accurately reflects my assessment of the patient and the plan of care.   Yaquelin Palomo RPH, PharmD

## 2024-10-07 NOTE — PROGRESS NOTES
Assessment & Plan     Hx laparoscopic cholecystectomy  Patient presented in September with intermittent RUQ pain, concerning for symptomatic cholelithiasis. RUQ US ordered which was also consistent with this diagnosis. Pain acutely worsened shortly after US, so patient presented to [facility]. He was admitted for laparoscopy cholecystectomy on 9/10/24. During procedure, he was found to have perforated cholecystitis, so drain was left in place. Patient has had two follow-ups with his general surgery team since then, and drain has since been removed. Pain is overall resolved, and patient is working on slowly increasing fat in his diet as tolerated.    Moderate persistent asthma with exacerbation  Patient first presented about 1 year ago with worsening asthma symptoms in the setting of COVID-19 infection. He has been doing well on Asmanex for daily maintenance therapy and albuterol PRN. He did not use inhalers over the summer but has started using them again with the changing of the season. He is interested in SMART therapy. Will go ahead and prescribe this and see if insurance will cover.  - budesonide-formoterol (SYMBICORT) 160-4.5 MCG/ACT Inhaler; Inhale 2 puffs once daily plus 1-2 puffs as needed. May use up to 12 puffs per day.    MED REC REQUIRED - See 10/7/24 Yaquelin Palomo, PharmD note for TCM medication reconciliation.    Janet Barber is a 39 year old, presenting for the following health issues:  Hospital F/U (Follow up hosp for gallbladder)    HPI     Hospital Follow-up Visit:    Hospital/Nursing Home/IP Rehab Facility: [facility]  Date of Admission: 9/10/24  Date of Discharge: 9/11/24  Reason(s) for Admission: Symptomatic cholelithiasis, s/p laparoscopic cholecystectomy, perforated cholecystitis with drain left in place  Was the patient in the ICU or did the patient experience delirium during hospitalization?  No  Do you have any other stressors you would like to discuss  "with your provider? No    Problems taking medications regularly:  None  Medication changes since discharge: None  Problems adhering to non-medication therapy:  None    Summary of hospitalization:  [facility] discharge summary reviewed  Diagnostic Tests/Treatments reviewed.  Follow up needed: none  Other Healthcare Providers Involved in Patient s Care:         Surgical follow-up appointment - 9/16/24 & 9/23/24  Update since discharge: improved.     Plan of care communicated with patient         Objective    /86   Pulse 83   Temp 98.3  F (36.8  C) (Oral)   Resp 16   Ht 1.956 m (6' 5\")   Wt 149.2 kg (329 lb)   SpO2 97%   BMI 39.01 kg/m    Body mass index is 39.01 kg/m .  Physical Exam   GENERAL: alert and no distress  NECK: no asymmetry, masses, or scars  RESP: lungs clear to auscultation - no rales, rhonchi or wheezes  CV: regular rate and rhythm, normal S1 S2, no S3 or S4, no murmur  ABDOMEN: soft, nontender, no masses, well-healing scars from recent laparoscopic cholecystectomy and drain placement/removal  NEURO: normal strength and tone, mentation intact and speech normal  PSYCH: mentation appears normal, affect normal/bright          Signed Electronically by: Anita Reynoso MD    "

## 2025-02-26 DIAGNOSIS — J45.41 MODERATE PERSISTENT ASTHMA WITH EXACERBATION: ICD-10-CM

## 2025-03-04 NOTE — TELEPHONE ENCOUNTER
Budesonide-formoterol 160/4.5 inhaler - 1-2 puffs PRN        Last Written Prescription Date:  10/7/24  Last Fill Quantity: 20.4,   # refills: 11  Last Office Visit : 10/07/24   Future Office visit:  None    Routing refill request to provider for review/approval because:   Medication is active on med list and the sig matches. RN to manually verify dose and sig if red X/fail.    Recent (6 mo) or future (90 days) visit within the authorizing provider's specialty

## 2025-03-05 DIAGNOSIS — J45.41 MODERATE PERSISTENT ASTHMA WITH EXACERBATION: ICD-10-CM

## 2025-03-05 RX ORDER — BUDESONIDE AND FORMOTEROL FUMARATE DIHYDRATE 160; 4.5 UG/1; UG/1
AEROSOL RESPIRATORY (INHALATION)
Qty: 30.6 G | Refills: 3 | Status: SHIPPED | OUTPATIENT
Start: 2025-03-05

## 2025-03-05 RX ORDER — BUDESONIDE AND FORMOTEROL FUMARATE DIHYDRATE 160; 4.5 UG/1; UG/1
AEROSOL RESPIRATORY (INHALATION)
Qty: 30.6 G | Refills: 1 | Status: SHIPPED | OUTPATIENT
Start: 2025-03-05 | End: 2025-03-05

## 2025-06-08 ENCOUNTER — HEALTH MAINTENANCE LETTER (OUTPATIENT)
Age: 40
End: 2025-06-08
